# Patient Record
Sex: FEMALE | Race: BLACK OR AFRICAN AMERICAN | Employment: OTHER | URBAN - METROPOLITAN AREA
[De-identification: names, ages, dates, MRNs, and addresses within clinical notes are randomized per-mention and may not be internally consistent; named-entity substitution may affect disease eponyms.]

---

## 2017-02-08 PROBLEM — R10.13 EPIGASTRIC PAIN: Status: ACTIVE | Noted: 2017-02-08

## 2017-06-19 PROBLEM — E55.9 HYPOVITAMINOSIS D: Status: ACTIVE | Noted: 2017-06-19

## 2018-04-20 ENCOUNTER — HOSPITAL ENCOUNTER (OUTPATIENT)
Age: 58
Discharge: HOME | End: 2018-04-22
Payer: COMMERCIAL

## 2018-04-20 ENCOUNTER — HOSPITAL ENCOUNTER (OUTPATIENT)
Dept: GENERAL RADIOLOGY | Age: 58
Discharge: HOME | End: 2018-04-22
Payer: COMMERCIAL

## 2018-04-20 DIAGNOSIS — M25.512 PAIN IN JOINT OF LEFT SHOULDER: ICD-10-CM

## 2018-04-20 PROCEDURE — 73030 X-RAY EXAM OF SHOULDER: CPT

## 2018-04-22 ENCOUNTER — APPOINTMENT (OUTPATIENT)
Dept: ULTRASOUND IMAGING | Age: 58
End: 2018-04-22
Payer: COMMERCIAL

## 2018-04-22 ENCOUNTER — HOSPITAL ENCOUNTER (EMERGENCY)
Age: 58
Discharge: HOME | End: 2018-04-22
Payer: COMMERCIAL

## 2018-04-22 VITALS
OXYGEN SATURATION: 96 % | BODY MASS INDEX: 42.95 KG/M2 | HEIGHT: 69 IN | HEART RATE: 92 BPM | DIASTOLIC BLOOD PRESSURE: 85 MMHG | TEMPERATURE: 97.9 F | SYSTOLIC BLOOD PRESSURE: 158 MMHG | RESPIRATION RATE: 20 BRPM | WEIGHT: 290 LBS

## 2018-04-22 DIAGNOSIS — M79.602 LEFT ARM PAIN: Primary | ICD-10-CM

## 2018-04-22 PROCEDURE — 96372 THER/PROPH/DIAG INJ SC/IM: CPT

## 2018-04-22 PROCEDURE — 93971 EXTREMITY STUDY: CPT

## 2018-04-22 PROCEDURE — 99212 OFFICE O/P EST SF 10 MIN: CPT

## 2018-04-22 PROCEDURE — 6360000002 HC RX W HCPCS: Performed by: PHYSICIAN ASSISTANT

## 2018-04-22 RX ORDER — DEXAMETHASONE SODIUM PHOSPHATE 10 MG/ML
10 INJECTION, SOLUTION INTRAMUSCULAR; INTRAVENOUS ONCE
Status: COMPLETED | OUTPATIENT
Start: 2018-04-22 | End: 2018-04-22

## 2018-04-22 RX ADMIN — DEXAMETHASONE SODIUM PHOSPHATE 10 MG: 10 INJECTION, SOLUTION INTRAMUSCULAR; INTRAVENOUS at 14:40

## 2018-04-22 ASSESSMENT — ENCOUNTER SYMPTOMS
WHEEZING: 0
ABDOMINAL DISTENTION: 0
EYE DISCHARGE: 0
BACK PAIN: 0
VOMITING: 0
EYE REDNESS: 0
DIARRHEA: 0
SHORTNESS OF BREATH: 0
SORE THROAT: 0
COUGH: 0
EYE PAIN: 0
NAUSEA: 0
SINUS PRESSURE: 0

## 2018-04-22 ASSESSMENT — PAIN DESCRIPTION - ONSET: ONSET: GRADUAL

## 2018-04-22 ASSESSMENT — PAIN DESCRIPTION - PROGRESSION: CLINICAL_PROGRESSION: GRADUALLY WORSENING

## 2018-04-22 ASSESSMENT — PAIN DESCRIPTION - LOCATION: LOCATION: ARM

## 2018-04-22 ASSESSMENT — PAIN DESCRIPTION - FREQUENCY: FREQUENCY: CONTINUOUS

## 2018-04-22 ASSESSMENT — PAIN SCALES - GENERAL: PAINLEVEL_OUTOF10: 9

## 2018-04-22 ASSESSMENT — PAIN DESCRIPTION - DESCRIPTORS: DESCRIPTORS: ACHING;THROBBING

## 2018-04-22 ASSESSMENT — PAIN DESCRIPTION - ORIENTATION: ORIENTATION: LEFT

## 2018-04-22 ASSESSMENT — PAIN DESCRIPTION - PAIN TYPE: TYPE: ACUTE PAIN

## 2018-05-31 ENCOUNTER — HOSPITAL ENCOUNTER (EMERGENCY)
Age: 58
Discharge: HOME | End: 2018-05-31
Payer: COMMERCIAL

## 2018-05-31 VITALS
OXYGEN SATURATION: 97 % | HEART RATE: 88 BPM | BODY MASS INDEX: 42.09 KG/M2 | SYSTOLIC BLOOD PRESSURE: 100 MMHG | WEIGHT: 285 LBS | RESPIRATION RATE: 20 BRPM | TEMPERATURE: 98.3 F | DIASTOLIC BLOOD PRESSURE: 58 MMHG

## 2018-05-31 DIAGNOSIS — J45.31 MILD PERSISTENT ASTHMA WITH EXACERBATION: Primary | ICD-10-CM

## 2018-05-31 DIAGNOSIS — S81.802A WOUND OF LEFT LOWER EXTREMITY, INITIAL ENCOUNTER: ICD-10-CM

## 2018-05-31 PROCEDURE — 99213 OFFICE O/P EST LOW 20 MIN: CPT

## 2018-05-31 PROCEDURE — 94640 AIRWAY INHALATION TREATMENT: CPT

## 2018-05-31 PROCEDURE — 6370000000 HC RX 637 (ALT 250 FOR IP): Performed by: PHYSICIAN ASSISTANT

## 2018-05-31 RX ORDER — ALBUTEROL SULFATE 2.5 MG/3ML
2.5 SOLUTION RESPIRATORY (INHALATION) EVERY 6 HOURS PRN
Qty: 120 EACH | Refills: 0 | Status: SHIPPED | OUTPATIENT
Start: 2018-05-31 | End: 2019-06-26

## 2018-05-31 RX ORDER — IPRATROPIUM BROMIDE AND ALBUTEROL SULFATE 2.5; .5 MG/3ML; MG/3ML
1 SOLUTION RESPIRATORY (INHALATION) ONCE
Status: COMPLETED | OUTPATIENT
Start: 2018-05-31 | End: 2018-05-31

## 2018-05-31 RX ORDER — PREDNISONE 10 MG/1
40 TABLET ORAL DAILY
Qty: 20 TABLET | Refills: 0 | Status: SHIPPED | OUTPATIENT
Start: 2018-05-31 | End: 2018-06-05

## 2018-05-31 RX ADMIN — IPRATROPIUM BROMIDE AND ALBUTEROL SULFATE 1 AMPULE: .5; 3 SOLUTION RESPIRATORY (INHALATION) at 20:12

## 2018-06-21 ENCOUNTER — PREP FOR PROCEDURE (OUTPATIENT)
Dept: PAIN MANAGEMENT | Age: 58
End: 2018-06-21

## 2018-06-21 ENCOUNTER — OFFICE VISIT (OUTPATIENT)
Dept: PAIN MANAGEMENT | Age: 58
End: 2018-06-21
Payer: COMMERCIAL

## 2018-06-21 VITALS
BODY MASS INDEX: 42.95 KG/M2 | HEIGHT: 69 IN | WEIGHT: 290 LBS | HEART RATE: 86 BPM | RESPIRATION RATE: 16 BRPM | TEMPERATURE: 98.3 F | OXYGEN SATURATION: 95 % | DIASTOLIC BLOOD PRESSURE: 76 MMHG | SYSTOLIC BLOOD PRESSURE: 122 MMHG

## 2018-06-21 DIAGNOSIS — M25.512 CHRONIC LEFT SHOULDER PAIN: ICD-10-CM

## 2018-06-21 DIAGNOSIS — G89.4 CHRONIC PAIN SYNDROME: ICD-10-CM

## 2018-06-21 DIAGNOSIS — M47.816 LUMBAR FACET ARTHROPATHY: ICD-10-CM

## 2018-06-21 DIAGNOSIS — M47.816 LUMBAR SPONDYLOSIS: Primary | ICD-10-CM

## 2018-06-21 DIAGNOSIS — M25.571 CHRONIC PAIN OF RIGHT ANKLE: ICD-10-CM

## 2018-06-21 DIAGNOSIS — M17.0 PRIMARY OSTEOARTHRITIS OF BOTH KNEES: ICD-10-CM

## 2018-06-21 DIAGNOSIS — M19.012 PRIMARY OSTEOARTHRITIS OF LEFT SHOULDER: ICD-10-CM

## 2018-06-21 DIAGNOSIS — G89.29 CHRONIC LEFT SHOULDER PAIN: ICD-10-CM

## 2018-06-21 DIAGNOSIS — G89.29 CHRONIC PAIN OF RIGHT ANKLE: ICD-10-CM

## 2018-06-21 PROCEDURE — 99204 OFFICE O/P NEW MOD 45 MIN: CPT | Performed by: PAIN MEDICINE

## 2018-06-21 RX ORDER — MELOXICAM 7.5 MG/1
7.5 TABLET ORAL DAILY
Qty: 30 TABLET | Refills: 0 | Status: SHIPPED | OUTPATIENT
Start: 2018-06-21 | End: 2019-04-17 | Stop reason: ALTCHOICE

## 2018-06-21 RX ORDER — SODIUM CHLORIDE 0.9 % (FLUSH) 0.9 %
10 SYRINGE (ML) INJECTION EVERY 12 HOURS SCHEDULED
Status: CANCELLED | OUTPATIENT
Start: 2018-06-21

## 2018-06-21 RX ORDER — SODIUM CHLORIDE 0.9 % (FLUSH) 0.9 %
10 SYRINGE (ML) INJECTION PRN
Status: CANCELLED | OUTPATIENT
Start: 2018-06-21

## 2018-08-08 ENCOUNTER — HOSPITAL ENCOUNTER (EMERGENCY)
Age: 58
Discharge: HOME | End: 2018-08-08
Payer: COMMERCIAL

## 2018-08-08 VITALS
HEART RATE: 96 BPM | TEMPERATURE: 98.1 F | SYSTOLIC BLOOD PRESSURE: 139 MMHG | RESPIRATION RATE: 18 BRPM | DIASTOLIC BLOOD PRESSURE: 75 MMHG | OXYGEN SATURATION: 97 % | WEIGHT: 290 LBS | BODY MASS INDEX: 42.83 KG/M2

## 2018-08-08 DIAGNOSIS — J45.901 EXACERBATION OF ASTHMA, UNSPECIFIED ASTHMA SEVERITY, UNSPECIFIED WHETHER PERSISTENT: Primary | ICD-10-CM

## 2018-08-08 DIAGNOSIS — J40 BRONCHITIS: ICD-10-CM

## 2018-08-08 PROCEDURE — 6360000002 HC RX W HCPCS: Performed by: NURSE PRACTITIONER

## 2018-08-08 PROCEDURE — 96372 THER/PROPH/DIAG INJ SC/IM: CPT

## 2018-08-08 PROCEDURE — 99212 OFFICE O/P EST SF 10 MIN: CPT

## 2018-08-08 RX ORDER — PREDNISONE 10 MG/1
40 TABLET ORAL DAILY
Qty: 20 TABLET | Refills: 0 | Status: SHIPPED | OUTPATIENT
Start: 2018-08-08 | End: 2018-08-13

## 2018-08-08 RX ORDER — DEXAMETHASONE SODIUM PHOSPHATE 10 MG/ML
10 INJECTION, SOLUTION INTRAMUSCULAR; INTRAVENOUS ONCE
Status: COMPLETED | OUTPATIENT
Start: 2018-08-08 | End: 2018-08-08

## 2018-08-08 RX ADMIN — DEXAMETHASONE SODIUM PHOSPHATE 10 MG: 10 INJECTION INTRAMUSCULAR; INTRAVENOUS at 20:01

## 2018-08-08 ASSESSMENT — PAIN SCALES - GENERAL: PAINLEVEL_OUTOF10: 8

## 2018-08-08 ASSESSMENT — PAIN DESCRIPTION - DESCRIPTORS: DESCRIPTORS: ACHING

## 2018-08-08 ASSESSMENT — PAIN DESCRIPTION - LOCATION: LOCATION: GENERALIZED

## 2018-08-13 NOTE — ED PROVIDER NOTES
sister; High Cholesterol in her father and mother; Hypertension in her mother; Osteoarthritis in her father, mother, and sister. The patients home medications have been reviewed. Allergies: Augmentin [amoxicillin-pot clavulanate] and Coricidin d cold-flu-sinus [chlorphen-pe-acetaminophen]       ---------------------------------------------------PHYSICAL EXAM--------------------------------------    Constitutional/General: Alert and oriented x3, well appearing, non toxic in NAD  Head: Normocephalic and atraumatic  Eyes: PERRL, EOMI  Mouth: Oropharynx clear, handling secretions, no trismus  Neck: Supple, full ROM, non tender to palpation in the midline, no stridor, no crepitus, no meningeal signs  Pulmonary: Lungs  Diminished with slight exp wheeze. Not in respiratory distress  Cardiovascular:  Regular rate. Regular rhythm. No murmurs, gallops, or rubs. 2+ distal pulses  Chest: no chest wall tenderness  Abdomen: Soft. Non tender. Non distended. Musculoskeletal: Moves all extremities x 4. Warm and well perfused, no clubbing, cyanosis, or edema. Capillary refill <3 seconds  Skin: warm and dry. No rashes. Neurologic: GCS 15  Psych: Normal Affect      -------------------------------------------------- RESULTS -------------------------------------------------    LABS:  No results found for this visit on 08/08/18. RADIOLOGY:  Interpreted by Radiologist.  No orders to display           ------------------------- NURSING NOTES AND VITALS REVIEWED ---------------------------   The nursing notes within the ED encounter and vital signs as below have been reviewed. /75   Pulse 96   Temp 98.1 °F (36.7 °C) (Oral)   Resp 18   Wt 290 lb (131.5 kg)   SpO2 97%   BMI 42.83 kg/m²   Oxygen Saturation Interpretation: Normal    The patients available past medical records and past encounters were reviewed.         ------------------------------ ED COURSE/MEDICAL DECISION MAKING----------------------  Medications dexamethasone (PF) (DECADRON) injection 10 mg (10 mg Intramuscular Given 8/8/18 2001)             Medical Decision Making:    Exam and history c/w bronchitis.  No evidence of localizing infection or PNA.  She was ordered prednisone and a z- pack    Plan:        This patient's ED course included: a personal history and physicial eaxmination and re-evaluation prior to disposition    This patient has remained hemodynamically stable during their ED course. Counseling: The emergency provider has spoken with the patient and discussed todays results, in addition to providing specific details for the plan of care and counseling regarding the diagnosis and prognosis. Questions are answered at this time and they are agreeable with the plan.       --------------------------------- IMPRESSION AND DISPOSITION ---------------------------------    IMPRESSION  1. Exacerbation of asthma, unspecified asthma severity, unspecified whether persistent    2.  Bronchitis        DISPOSITION  Disposition: Discharge to home  Patient condition is good           HERBERT Jameson - CNP  08/13/18 9676

## 2018-08-16 ENCOUNTER — HOSPITAL ENCOUNTER (OUTPATIENT)
Dept: CT IMAGING | Age: 58
Discharge: HOME | End: 2018-08-16
Payer: COMMERCIAL

## 2018-08-16 DIAGNOSIS — R91.1 LUNG NODULE: ICD-10-CM

## 2018-08-16 PROCEDURE — 71250 CT THORAX DX C-: CPT

## 2018-09-29 ENCOUNTER — HOSPITAL ENCOUNTER (EMERGENCY)
Age: 58
Discharge: HOME | End: 2018-09-29
Payer: COMMERCIAL

## 2018-09-29 VITALS
DIASTOLIC BLOOD PRESSURE: 79 MMHG | BODY MASS INDEX: 42.83 KG/M2 | TEMPERATURE: 98.3 F | WEIGHT: 290 LBS | SYSTOLIC BLOOD PRESSURE: 112 MMHG | OXYGEN SATURATION: 95 % | RESPIRATION RATE: 20 BRPM | HEART RATE: 99 BPM

## 2018-09-29 DIAGNOSIS — J45.21 MILD INTERMITTENT ASTHMA WITH EXACERBATION: Primary | ICD-10-CM

## 2018-09-29 PROCEDURE — 6360000002 HC RX W HCPCS: Performed by: PHYSICIAN ASSISTANT

## 2018-09-29 PROCEDURE — 6370000000 HC RX 637 (ALT 250 FOR IP): Performed by: PHYSICIAN ASSISTANT

## 2018-09-29 PROCEDURE — 99212 OFFICE O/P EST SF 10 MIN: CPT

## 2018-09-29 RX ORDER — AZITHROMYCIN 250 MG/1
TABLET, FILM COATED ORAL
Qty: 6 TABLET | Refills: 0 | Status: SHIPPED | OUTPATIENT
Start: 2018-09-29 | End: 2018-10-09

## 2018-09-29 RX ORDER — DEXAMETHASONE SODIUM PHOSPHATE 10 MG/ML
10 INJECTION, SOLUTION INTRAMUSCULAR; INTRAVENOUS ONCE
Status: COMPLETED | OUTPATIENT
Start: 2018-09-29 | End: 2018-09-29

## 2018-09-29 RX ORDER — IPRATROPIUM BROMIDE AND ALBUTEROL SULFATE 2.5; .5 MG/3ML; MG/3ML
1 SOLUTION RESPIRATORY (INHALATION) ONCE
Status: COMPLETED | OUTPATIENT
Start: 2018-09-29 | End: 2018-09-29

## 2018-09-29 RX ORDER — PREDNISONE 10 MG/1
40 TABLET ORAL DAILY
Qty: 20 TABLET | Refills: 0 | Status: SHIPPED | OUTPATIENT
Start: 2018-09-29 | End: 2018-10-04

## 2018-09-29 RX ADMIN — IPRATROPIUM BROMIDE AND ALBUTEROL SULFATE 1 AMPULE: .5; 3 SOLUTION RESPIRATORY (INHALATION) at 16:46

## 2018-09-29 RX ADMIN — DEXAMETHASONE SODIUM PHOSPHATE 10 MG: 10 INJECTION INTRAMUSCULAR; INTRAVENOUS at 16:46

## 2018-09-29 ASSESSMENT — PAIN DESCRIPTION - LOCATION: LOCATION: GENERALIZED

## 2018-09-29 ASSESSMENT — PAIN SCALES - GENERAL: PAINLEVEL_OUTOF10: 7

## 2018-09-29 ASSESSMENT — PAIN DESCRIPTION - PAIN TYPE: TYPE: ACUTE PAIN

## 2018-10-11 ENCOUNTER — HOSPITAL ENCOUNTER (EMERGENCY)
Age: 58
Discharge: HOME | End: 2018-10-11
Payer: COMMERCIAL

## 2018-10-11 VITALS
TEMPERATURE: 98.7 F | HEART RATE: 105 BPM | SYSTOLIC BLOOD PRESSURE: 143 MMHG | RESPIRATION RATE: 20 BRPM | DIASTOLIC BLOOD PRESSURE: 81 MMHG | WEIGHT: 280 LBS | OXYGEN SATURATION: 96 % | BODY MASS INDEX: 41.35 KG/M2

## 2018-10-11 DIAGNOSIS — J45.901 MODERATE ASTHMA WITH ACUTE EXACERBATION, UNSPECIFIED WHETHER PERSISTENT: Primary | ICD-10-CM

## 2018-10-11 DIAGNOSIS — R05.9 COUGH: ICD-10-CM

## 2018-10-11 PROCEDURE — 94640 AIRWAY INHALATION TREATMENT: CPT

## 2018-10-11 PROCEDURE — 6370000000 HC RX 637 (ALT 250 FOR IP): Performed by: PHYSICIAN ASSISTANT

## 2018-10-11 PROCEDURE — 99213 OFFICE O/P EST LOW 20 MIN: CPT

## 2018-10-11 RX ORDER — IPRATROPIUM BROMIDE AND ALBUTEROL SULFATE 2.5; .5 MG/3ML; MG/3ML
1 SOLUTION RESPIRATORY (INHALATION) ONCE
Status: COMPLETED | OUTPATIENT
Start: 2018-10-11 | End: 2018-10-11

## 2018-10-11 RX ORDER — BENZONATATE 100 MG/1
100 CAPSULE ORAL 3 TIMES DAILY PRN
Qty: 21 CAPSULE | Refills: 0 | Status: SHIPPED | OUTPATIENT
Start: 2018-10-11 | End: 2018-10-18

## 2018-10-11 RX ADMIN — IPRATROPIUM BROMIDE AND ALBUTEROL SULFATE 1 AMPULE: .5; 3 SOLUTION RESPIRATORY (INHALATION) at 11:27

## 2018-10-11 ASSESSMENT — PAIN DESCRIPTION - LOCATION: LOCATION: GENERALIZED

## 2018-10-11 ASSESSMENT — PAIN DESCRIPTION - PAIN TYPE: TYPE: ACUTE PAIN

## 2018-10-11 ASSESSMENT — PAIN SCALES - GENERAL: PAINLEVEL_OUTOF10: 8

## 2018-10-11 NOTE — ED PROVIDER NOTES
Procedure Laterality Date    COLONOSCOPY      HYSTERECTOMY      SINUS SURGERY      TONSILLECTOMY      TONSILLECTOMY     Social History:  reports that she has never smoked. She has never used smokeless tobacco. She reports that she does not drink alcohol or use drugs. Family History: family history includes Cancer in her sister; High Cholesterol in her father and mother; Hypertension in her mother; Osteoarthritis in her father, mother, and sister. Allergies: Augmentin [amoxicillin-pot clavulanate] and Coricidin d cold-flu-sinus [chlorphen-pe-acetaminophen]    Physical Exam            ED Triage Vitals [10/11/18 1054]   BP Temp Temp Source Pulse Resp SpO2 Height Weight   (!) 143/81 98.7 °F (37.1 °C) Oral 105 20 96 % -- 280 lb (127 kg)      Oxygen Saturation Interpretation: Normal.    Constitutional:  Alert, development consistent with age. Ears:  External Ears: Bilateral normal.               TM's & External Canals: normal appearance, normal TMs bilaterally. Nose:   There is no discharge, swelling or lesions noted. Sinuses: no Bilateral maxillary sinus tenderness. no Bilateral frontal sinus tenderness. Mouth:  normal tongue and buccal mucosa. Throat: no erythema or exudates noted. Teeth and gums normal..  Airway Patent. Neck/Lymphatics:  Neck Supple. There is no  preauricular, anterior cervical and posterior cervical node tenderness. Respiratory:   Breath sounds: Bilateral normal.  Lung sounds: wheezing- scattered. CV:  Regular rate and rhythm, normal heart sounds, without pathological murmurs, ectopy, gallops, or rubs. GI:  Abdomen Soft, nontender, good bowel sounds. No firm or pulsatile mass. Integument:  Normal turgor. Warm, dry, without visible rash. Neurological:  Oriented. Motor functions intact.        Lab / Imaging Results   (All laboratory and radiology results have been personally reviewed by myself)  Labs:  No results found for this visit on 100 MG CAPSULE    Take 1 capsule by mouth 3 times daily as needed for Cough     Electronically signed by TAVARES Cohen   DD: 10/11/18  **This report was transcribed using voice recognition software. Every effort was made to ensure accuracy; however, inadvertent computerized transcription errors may be present.   END OF ED PROVIDER NOTE        TAVARES Ugalde  10/11/18 [address] [place] [address]  10/11/18 6036

## 2018-10-31 ENCOUNTER — HOSPITAL ENCOUNTER (EMERGENCY)
Age: 58
Discharge: HOME | End: 2018-10-31
Payer: COMMERCIAL

## 2018-10-31 VITALS
TEMPERATURE: 99.1 F | WEIGHT: 280 LBS | DIASTOLIC BLOOD PRESSURE: 63 MMHG | BODY MASS INDEX: 41.35 KG/M2 | SYSTOLIC BLOOD PRESSURE: 105 MMHG | HEART RATE: 100 BPM | RESPIRATION RATE: 20 BRPM | OXYGEN SATURATION: 95 %

## 2018-10-31 DIAGNOSIS — J06.9 ACUTE UPPER RESPIRATORY INFECTION: Primary | ICD-10-CM

## 2018-10-31 DIAGNOSIS — J45.20 INTERMITTENT ASTHMA, UNSPECIFIED ASTHMA SEVERITY, UNSPECIFIED WHETHER COMPLICATED: ICD-10-CM

## 2018-10-31 PROCEDURE — 94640 AIRWAY INHALATION TREATMENT: CPT

## 2018-10-31 PROCEDURE — 6370000000 HC RX 637 (ALT 250 FOR IP): Performed by: NURSE PRACTITIONER

## 2018-10-31 PROCEDURE — 6360000002 HC RX W HCPCS: Performed by: NURSE PRACTITIONER

## 2018-10-31 PROCEDURE — 99212 OFFICE O/P EST SF 10 MIN: CPT

## 2018-10-31 RX ORDER — AZITHROMYCIN 250 MG/1
TABLET, FILM COATED ORAL
Qty: 6 TABLET | Refills: 0 | Status: SHIPPED | OUTPATIENT
Start: 2018-10-31 | End: 2018-11-10

## 2018-10-31 RX ORDER — IPRATROPIUM BROMIDE AND ALBUTEROL SULFATE 2.5; .5 MG/3ML; MG/3ML
1 SOLUTION RESPIRATORY (INHALATION) ONCE
Status: COMPLETED | OUTPATIENT
Start: 2018-10-31 | End: 2018-10-31

## 2018-10-31 RX ORDER — METHYLPREDNISOLONE 4 MG/1
TABLET ORAL
Qty: 21 TABLET | Status: SHIPPED | OUTPATIENT
Start: 2018-10-31 | End: 2018-11-06

## 2018-10-31 RX ORDER — DEXAMETHASONE SODIUM PHOSPHATE 10 MG/ML
10 INJECTION, SOLUTION INTRAMUSCULAR; INTRAVENOUS ONCE
Status: COMPLETED | OUTPATIENT
Start: 2018-10-31 | End: 2018-10-31

## 2018-10-31 RX ADMIN — IPRATROPIUM BROMIDE AND ALBUTEROL SULFATE 1 AMPULE: .5; 3 SOLUTION RESPIRATORY (INHALATION) at 12:56

## 2018-10-31 RX ADMIN — DEXAMETHASONE SODIUM PHOSPHATE 10 MG: 10 INJECTION, SOLUTION INTRAMUSCULAR; INTRAVENOUS at 12:56

## 2019-01-06 ENCOUNTER — HOSPITAL ENCOUNTER (EMERGENCY)
Age: 59
Discharge: HOME | End: 2019-01-06
Payer: COMMERCIAL

## 2019-01-06 VITALS
RESPIRATION RATE: 20 BRPM | BODY MASS INDEX: 40.32 KG/M2 | SYSTOLIC BLOOD PRESSURE: 136 MMHG | TEMPERATURE: 98.4 F | OXYGEN SATURATION: 97 % | HEART RATE: 94 BPM | DIASTOLIC BLOOD PRESSURE: 93 MMHG | WEIGHT: 273 LBS

## 2019-01-06 DIAGNOSIS — J45.901 EXACERBATION OF ASTHMA, UNSPECIFIED ASTHMA SEVERITY, UNSPECIFIED WHETHER PERSISTENT: Primary | ICD-10-CM

## 2019-01-06 PROCEDURE — 94640 AIRWAY INHALATION TREATMENT: CPT

## 2019-01-06 PROCEDURE — 96372 THER/PROPH/DIAG INJ SC/IM: CPT

## 2019-01-06 PROCEDURE — 6370000000 HC RX 637 (ALT 250 FOR IP): Performed by: NURSE PRACTITIONER

## 2019-01-06 PROCEDURE — 6360000002 HC RX W HCPCS: Performed by: NURSE PRACTITIONER

## 2019-01-06 PROCEDURE — 99212 OFFICE O/P EST SF 10 MIN: CPT

## 2019-01-06 RX ORDER — IPRATROPIUM BROMIDE AND ALBUTEROL SULFATE 2.5; .5 MG/3ML; MG/3ML
1 SOLUTION RESPIRATORY (INHALATION) ONCE
Status: COMPLETED | OUTPATIENT
Start: 2019-01-06 | End: 2019-01-06

## 2019-01-06 RX ORDER — AZITHROMYCIN 250 MG/1
TABLET, FILM COATED ORAL
Qty: 6 TABLET | Refills: 0 | Status: SHIPPED | OUTPATIENT
Start: 2019-01-06 | End: 2019-01-16

## 2019-01-06 RX ORDER — PREDNISONE 10 MG/1
40 TABLET ORAL DAILY
Qty: 20 TABLET | Refills: 0 | Status: SHIPPED | OUTPATIENT
Start: 2019-01-06 | End: 2019-01-11

## 2019-01-06 RX ORDER — DEXAMETHASONE SODIUM PHOSPHATE 10 MG/ML
10 INJECTION, SOLUTION INTRAMUSCULAR; INTRAVENOUS ONCE
Status: COMPLETED | OUTPATIENT
Start: 2019-01-06 | End: 2019-01-06

## 2019-01-06 RX ADMIN — DEXAMETHASONE SODIUM PHOSPHATE 10 MG: 10 INJECTION, SOLUTION INTRAMUSCULAR; INTRAVENOUS at 17:54

## 2019-01-06 RX ADMIN — IPRATROPIUM BROMIDE AND ALBUTEROL SULFATE 1 AMPULE: .5; 3 SOLUTION RESPIRATORY (INHALATION) at 17:54

## 2019-02-22 ENCOUNTER — HOSPITAL ENCOUNTER (OUTPATIENT)
Age: 59
Discharge: HOME | End: 2019-02-24
Payer: COMMERCIAL

## 2019-02-22 ENCOUNTER — HOSPITAL ENCOUNTER (OUTPATIENT)
Dept: GENERAL RADIOLOGY | Age: 59
Discharge: HOME | End: 2019-02-24
Payer: COMMERCIAL

## 2019-02-22 DIAGNOSIS — J44.9 OBSTRUCTIVE CHRONIC BRONCHITIS WITHOUT EXACERBATION (HCC): ICD-10-CM

## 2019-02-22 PROCEDURE — 71046 X-RAY EXAM CHEST 2 VIEWS: CPT

## 2019-04-13 ENCOUNTER — HOSPITAL ENCOUNTER (EMERGENCY)
Age: 59
Discharge: HOME | End: 2019-04-13
Payer: COMMERCIAL

## 2019-04-13 VITALS
BODY MASS INDEX: 40.17 KG/M2 | OXYGEN SATURATION: 98 % | WEIGHT: 272 LBS | HEART RATE: 89 BPM | SYSTOLIC BLOOD PRESSURE: 134 MMHG | RESPIRATION RATE: 20 BRPM | DIASTOLIC BLOOD PRESSURE: 77 MMHG | TEMPERATURE: 98.6 F

## 2019-04-13 DIAGNOSIS — J40 BRONCHITIS: Primary | ICD-10-CM

## 2019-04-13 DIAGNOSIS — J98.01 BRONCHOSPASM, ACUTE: ICD-10-CM

## 2019-04-13 PROCEDURE — 99212 OFFICE O/P EST SF 10 MIN: CPT

## 2019-04-13 PROCEDURE — 6360000002 HC RX W HCPCS: Performed by: NURSE PRACTITIONER

## 2019-04-13 PROCEDURE — 6370000000 HC RX 637 (ALT 250 FOR IP): Performed by: NURSE PRACTITIONER

## 2019-04-13 RX ORDER — PREDNISONE 10 MG/1
40 TABLET ORAL DAILY
Qty: 20 TABLET | Refills: 0 | Status: SHIPPED | OUTPATIENT
Start: 2019-04-13 | End: 2019-04-18

## 2019-04-13 RX ORDER — GUAIFENESIN/DEXTROMETHORPHAN 100-10MG/5
10 SYRUP ORAL 3 TIMES DAILY PRN
Qty: 120 ML | Refills: 0 | Status: SHIPPED | OUTPATIENT
Start: 2019-04-13 | End: 2019-04-23

## 2019-04-13 RX ORDER — IPRATROPIUM BROMIDE AND ALBUTEROL SULFATE 2.5; .5 MG/3ML; MG/3ML
1 SOLUTION RESPIRATORY (INHALATION) ONCE
Status: COMPLETED | OUTPATIENT
Start: 2019-04-13 | End: 2019-04-13

## 2019-04-13 RX ORDER — AZITHROMYCIN 250 MG/1
TABLET, FILM COATED ORAL
Qty: 1 PACKET | Refills: 0 | Status: SHIPPED | OUTPATIENT
Start: 2019-04-13 | End: 2019-04-17

## 2019-04-13 RX ORDER — DEXAMETHASONE SODIUM PHOSPHATE 10 MG/ML
10 INJECTION, SOLUTION INTRAMUSCULAR; INTRAVENOUS ONCE
Status: COMPLETED | OUTPATIENT
Start: 2019-04-13 | End: 2019-04-13

## 2019-04-13 RX ADMIN — IPRATROPIUM BROMIDE AND ALBUTEROL SULFATE 1 AMPULE: .5; 3 SOLUTION RESPIRATORY (INHALATION) at 16:55

## 2019-04-13 RX ADMIN — DEXAMETHASONE SODIUM PHOSPHATE 10 MG: 10 INJECTION, SOLUTION INTRAMUSCULAR; INTRAVENOUS at 16:55

## 2019-04-17 ENCOUNTER — HOSPITAL ENCOUNTER (EMERGENCY)
Age: 59
Discharge: HOME | End: 2019-04-17
Payer: COMMERCIAL

## 2019-04-17 ENCOUNTER — APPOINTMENT (OUTPATIENT)
Dept: GENERAL RADIOLOGY | Age: 59
End: 2019-04-17
Payer: COMMERCIAL

## 2019-04-17 VITALS
SYSTOLIC BLOOD PRESSURE: 131 MMHG | HEIGHT: 68 IN | OXYGEN SATURATION: 96 % | BODY MASS INDEX: 40.92 KG/M2 | TEMPERATURE: 98.3 F | HEART RATE: 97 BPM | WEIGHT: 270 LBS | DIASTOLIC BLOOD PRESSURE: 77 MMHG | RESPIRATION RATE: 20 BRPM

## 2019-04-17 DIAGNOSIS — K04.7 DENTAL ABSCESS: ICD-10-CM

## 2019-04-17 DIAGNOSIS — S80.01XA CONTUSION OF RIGHT KNEE, INITIAL ENCOUNTER: Primary | ICD-10-CM

## 2019-04-17 DIAGNOSIS — M17.11 OSTEOARTHRITIS OF RIGHT KNEE, UNSPECIFIED OSTEOARTHRITIS TYPE: ICD-10-CM

## 2019-04-17 DIAGNOSIS — S43.409A SPRAIN OF SHOULDER, UNSPECIFIED LATERALITY, UNSPECIFIED SHOULDER SPRAIN TYPE, INITIAL ENCOUNTER: ICD-10-CM

## 2019-04-17 DIAGNOSIS — M77.9 TENDONITIS: ICD-10-CM

## 2019-04-17 LAB
BASOPHILS ABSOLUTE: 0.05 E9/L (ref 0–0.2)
BASOPHILS RELATIVE PERCENT: 0.7 % (ref 0–2)
CO2: 32 MMOL/L (ref 22–29)
EOSINOPHILS ABSOLUTE: 0.35 E9/L (ref 0.05–0.5)
EOSINOPHILS RELATIVE PERCENT: 5.1 % (ref 0–6)
GFR AFRICAN AMERICAN: >60
GFR NON-AFRICAN AMERICAN: >60 ML/MIN/1.73
GLUCOSE BLD-MCNC: 112 MG/DL (ref 74–99)
HCT VFR BLD CALC: 40.6 % (ref 34–48)
HEMOGLOBIN: 12.6 G/DL (ref 11.5–15.5)
IMMATURE GRANULOCYTES #: 0.05 E9/L
IMMATURE GRANULOCYTES %: 0.7 % (ref 0–5)
LYMPHOCYTES ABSOLUTE: 2.23 E9/L (ref 1.5–4)
LYMPHOCYTES RELATIVE PERCENT: 32.5 % (ref 20–42)
MCH RBC QN AUTO: 26.2 PG (ref 26–35)
MCHC RBC AUTO-ENTMCNC: 31 % (ref 32–34.5)
MCV RBC AUTO: 84.4 FL (ref 80–99.9)
MONOCYTES ABSOLUTE: 0.78 E9/L (ref 0.1–0.95)
MONOCYTES RELATIVE PERCENT: 11.4 % (ref 2–12)
NEUTROPHILS ABSOLUTE: 3.41 E9/L (ref 1.8–7.3)
NEUTROPHILS RELATIVE PERCENT: 49.6 % (ref 43–80)
PDW BLD-RTO: 13.9 FL (ref 11.5–15)
PLATELET # BLD: 346 E9/L (ref 130–450)
PMV BLD AUTO: 9.5 FL (ref 7–12)
POC ANION GAP: 7 MMOL/L (ref 7–16)
POC BUN: 16 MG/DL (ref 8–23)
POC CHLORIDE: 103 MMOL/L (ref 100–108)
POC CREATININE: 0.8 MG/DL (ref 0.5–1)
POC POTASSIUM: 3.8 MMOL/L (ref 3.5–5)
POC SODIUM: 142 MMOL/L (ref 132–146)
RBC # BLD: 4.81 E12/L (ref 3.5–5.5)
STREP GRP A PCR: NEGATIVE
WBC # BLD: 6.9 E9/L (ref 4.5–11.5)

## 2019-04-17 PROCEDURE — 36415 COLL VENOUS BLD VENIPUNCTURE: CPT

## 2019-04-17 PROCEDURE — 99212 OFFICE O/P EST SF 10 MIN: CPT

## 2019-04-17 PROCEDURE — 73030 X-RAY EXAM OF SHOULDER: CPT

## 2019-04-17 PROCEDURE — 85025 COMPLETE CBC W/AUTO DIFF WBC: CPT

## 2019-04-17 PROCEDURE — 84520 ASSAY OF UREA NITROGEN: CPT

## 2019-04-17 PROCEDURE — 87880 STREP A ASSAY W/OPTIC: CPT

## 2019-04-17 PROCEDURE — 82947 ASSAY GLUCOSE BLOOD QUANT: CPT

## 2019-04-17 PROCEDURE — 80051 ELECTROLYTE PANEL: CPT

## 2019-04-17 PROCEDURE — 73560 X-RAY EXAM OF KNEE 1 OR 2: CPT

## 2019-04-17 PROCEDURE — 82565 ASSAY OF CREATININE: CPT

## 2019-04-17 RX ORDER — NAPROXEN 500 MG/1
500 TABLET ORAL 2 TIMES DAILY
Qty: 14 TABLET | Refills: 0 | Status: SHIPPED | OUTPATIENT
Start: 2019-04-17 | End: 2020-02-17 | Stop reason: ALTCHOICE

## 2019-04-17 RX ORDER — CLINDAMYCIN HYDROCHLORIDE 300 MG/1
300 CAPSULE ORAL 3 TIMES DAILY
Qty: 30 CAPSULE | Refills: 0 | Status: SHIPPED | OUTPATIENT
Start: 2019-04-17 | End: 2019-04-27

## 2019-04-17 ASSESSMENT — PAIN DESCRIPTION - PROGRESSION: CLINICAL_PROGRESSION: GRADUALLY WORSENING

## 2019-04-17 ASSESSMENT — PAIN SCALES - GENERAL: PAINLEVEL_OUTOF10: 8

## 2019-04-17 ASSESSMENT — PAIN DESCRIPTION - ORIENTATION: ORIENTATION: RIGHT;LEFT

## 2019-04-17 ASSESSMENT — PAIN DESCRIPTION - LOCATION: LOCATION: NECK;KNEE;SHOULDER

## 2019-04-17 ASSESSMENT — PAIN DESCRIPTION - ONSET: ONSET: GRADUAL

## 2019-04-17 ASSESSMENT — PAIN DESCRIPTION - DESCRIPTORS: DESCRIPTORS: ACHING;SORE

## 2019-04-17 ASSESSMENT — PAIN DESCRIPTION - PAIN TYPE: TYPE: ACUTE PAIN

## 2019-04-17 ASSESSMENT — PAIN DESCRIPTION - FREQUENCY: FREQUENCY: CONTINUOUS
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
## 2019-06-26 ENCOUNTER — HOSPITAL ENCOUNTER (EMERGENCY)
Age: 59
Discharge: HOME | End: 2019-06-26
Payer: COMMERCIAL

## 2019-06-26 VITALS
DIASTOLIC BLOOD PRESSURE: 89 MMHG | RESPIRATION RATE: 20 BRPM | SYSTOLIC BLOOD PRESSURE: 130 MMHG | TEMPERATURE: 98 F | OXYGEN SATURATION: 97 % | HEART RATE: 97 BPM

## 2019-06-26 DIAGNOSIS — J06.9 UPPER RESPIRATORY TRACT INFECTION, UNSPECIFIED TYPE: Primary | ICD-10-CM

## 2019-06-26 PROCEDURE — 99212 OFFICE O/P EST SF 10 MIN: CPT

## 2019-06-26 RX ORDER — PREDNISONE 10 MG/1
40 TABLET ORAL DAILY
Qty: 20 TABLET | Refills: 0 | Status: SHIPPED | OUTPATIENT
Start: 2019-06-26 | End: 2019-07-01

## 2019-06-26 RX ORDER — ALBUTEROL SULFATE 2.5 MG/3ML
2.5 SOLUTION RESPIRATORY (INHALATION) EVERY 6 HOURS PRN
Qty: 120 EACH | Refills: 0 | Status: SHIPPED | OUTPATIENT
Start: 2019-06-26 | End: 2019-09-10 | Stop reason: SDUPTHER

## 2019-06-26 RX ORDER — AZITHROMYCIN 250 MG/1
TABLET, FILM COATED ORAL
Qty: 6 TABLET | Refills: 0 | Status: SHIPPED | OUTPATIENT
Start: 2019-06-26 | End: 2019-07-06

## 2019-06-26 NOTE — ED PROVIDER NOTES
Department of Emergency Medicine   ED  Provider Note  Admit Date/RoomTime: 6/26/2019  7:28 PM  ED Room: 07/07  Chief Complaint:   URI (c/o congestion and drainage w loose productive cough - bilateral ear pressure )    History of Present Illness   Source of history provided by:  patient. History/Exam Limitations: none. Marlyn Salomon is a 60 y.o. old female with a past medical history of:   Past Medical History:   Diagnosis Date    Arthritis     Asthma     COPD (chronic obstructive pulmonary disease) ([facility]     Depressed     Diabetes mellitus ([facility]     Fibromyalgia     Hepatitis     HIV (human immunodeficiency virus infection) ([facility]     Hyperlipidemia     Hypertension     MS (multiple sclerosis) ([facility]     Respiratory disease     Sleep apnea     Sleep apnea     presents to the emergency department by private vehicle, for cough, sore throat, sinus pressure that started 2 days ago. She also c/o b/l ear pressure. She reports yellow sputum production. No chest pain or shortness of breath. No vomiting or diarrhea. Nothing makes it better or worse. No fevers or chills. ROS   Pertinent positives and negatives are stated within HPI, all other systems reviewed and are negative. Past Surgical History:  has a past surgical history that includes sinus surgery; Hysterectomy; Tonsillectomy; Colonoscopy; and Tonsillectomy. Social History:  reports that she has never smoked. She has never used smokeless tobacco. She reports that she does not drink alcohol or use drugs. Family History: family history includes Cancer in her sister; High Cholesterol in her father and mother; Hypertension in her mother; Osteoarthritis in her father, mother, and sister. Allergies: Amoxicillin;  Augmentin [amoxicillin-pot clavulanate]; and Coricidin d cold-flu-sinus [chlorphen-pe-acetaminophen]    Physical Exam           ED Triage Vitals   BP Temp Temp src Pulse Resp SpO2 Height Weight   -- -- -- -- -- -- -- -- Oxygen Saturation Interpretation: Normal.    · Constitutional:  Alert, development consistent with age. · Ears:  External Ears: Bilateral normal, mastoid tenderness absen tb/l. TM's & External Canals: normal TMs bilaterally. No erythema or bulging b/l  · Nose:   There is no discharge, swelling or lesions noted. · Sinuses: no Bilateral maxillary sinus tenderness. no Bilateral frontal sinus tenderness. · Mouth:  normal tongue and buccal mucosa. · Throat: no erythema or exudates noted. Teeth and gums normal. and mucous membranes moist.  Airway Patent. · Neck:  Supple. There is no  anterior cervical and posterior cervical node tenderness. · Respiratory:   Breath sounds: Bilateral normal.  Lung sounds: wheezing- scattered. No respiratory distress. · CV:  Regular rate and rhythm, normal heart sounds, without pathological murmurs, ectopy, gallops, or rubs. · GI:  Abdomen Soft, nontender, good bowel sounds. No firm or pulsatile mass. · Integument:  Normal turgor. Warm, dry, without visible rash. · Neurological:  Oriented. Motor functions intact. Lab / Imaging Results   (All laboratory and radiology results have been personally reviewed by myself)  Labs:  No results found for this visit on 06/26/19. Imaging: All Radiology results interpreted by Radiologist unless otherwise noted. No orders to display       ED Course / Medical Decision Making   Medications - No data to display         Consults:   None    Procedures:   none    Medical Decision Making:    Patient is not hypoxic or tachycardic. No respiratory distress. Mild scattered expiratory wheezing. Patient states that she does have a nebulizer machine at home but is out of her nebulizer medication. That is ordered for her as well as a Z-Nate and prednisone. Patient advised return to the ER or urgent care for any worsening symptoms. Otherwise follow-up with PCP. Counseling:     The emergency provider has spoken with the patient and discussed todays results, in addition to providing specific details for the plan of care and counseling regarding the diagnosis and prognosis. Questions are answered at this time and they are agreeable with the plan. Assessment     1. Upper respiratory tract infection, unspecified type      Plan   Discharge to home  Patient condition is good    New Medications     New Prescriptions    ALBUTEROL (PROVENTIL) (2.5 MG/3ML) 0.083% NEBULIZER SOLUTION    Take 3 mLs by nebulization every 6 hours as needed for Wheezing    AZITHROMYCIN (ZITHROMAX Z-MARIO) 250 MG TABLET    Take 2 tabs on day one, followed by 1 tablet daily for 4 days. PREDNISONE (DELTASONE) 10 MG TABLET    Take 4 tablets by mouth daily for 5 days     Electronically signed by TAVARES Hood   DD: 6/26/19  **This report was transcribed using voice recognition software. Every effort was made to ensure accuracy; however, inadvertent computerized transcription errors may be present.   END OF ED PROVIDER NOTE         Delma Hood  06/26/19 1949

## 2019-06-26 NOTE — DISCHARGE INSTR - COC
Continuity of Care Form    Patient Name: Heather Thao   :  1960  MRN:  [id number]    Admit date:  2019  Discharge date:  ***    Code Status Order: No Order   Advance Directives:     Admitting Physician:  No admitting provider for patient encounter.   PCP: HERBERT Brooks CNP    Discharging Nurse: [facility] Unit/Room#:   Discharging Unit Phone Number: ***    Emergency Contact:   Extended Emergency Contact Information  Primary Emergency Contact: Dimple Heard  Address: [address] [place] [address] [address]  Phone: [phone]  Relation: Parent  Secondary Emergency Contact: [employer] [facility] Phone: [phone]  Relation: Parent    Past Surgical History:  Past Surgical History:   Procedure Laterality Date    COLONOSCOPY      HYSTERECTOMY      SINUS SURGERY      TONSILLECTOMY      TONSILLECTOMY         Immunization History:   Immunization History   Administered Date(s) Administered    Influenza Virus Vaccine 2017    Influenza, Triv, 3 Years and older, IM ([facility]fluria (5 yrs and older) 2016    Pneumococcal Polysaccharide ([id number]) 2017       Active Problems:  Patient Active Problem List   Diagnosis Code    Retrocalcaneal bursitis M77.50    Ankle pain M25.579    DDD (degenerative disc disease), cervical M50.30    DDD (degenerative disc disease), lumbar M51.36    Neural foraminal stenosis of cervical spine M99.81    Neural foraminal stenosis of lumbar spine M99.83    Facet syndrome, lumbar M47.816    Cervical facet syndrome M47.812    Osteoarthritis of both knees M17.0    Osteoarthritis of both hips M16.0    Osteoarthritis, shoulder, bilateral M19.019    Osteoarthritis of ankle, bilateral M19.079    Asthma, allergic J45.909    Type 2 diabetes mellitus with hyperosmolarity without coma, without long-term current use of insulin ([facility] E11.00    Essential hypertension I10  Morbid obesity with body mass index (BMI) of 40.0 to 44.9 in adult ([facility]) E66.01, Z68.41    Osteoarthritis of multiple joints M15.9    Epigastric pain R10.13    Hypovitaminosis D E55.9    Chronic pain syndrome G89.4    Lumbar spondylosis M47.816    Lumbar facet arthropathy M47.816    Primary osteoarthritis of both knees M17.0    Primary osteoarthritis of left shoulder M19.012    Chronic left shoulder pain M25.512, G89.29       Isolation/Infection:   Isolation          No Isolation            Nurse Assessment:  Last Vital Signs: /89   Pulse 97   Temp 98 °F (36.7 °C) (Oral)   Resp 20   SpO2 97%     Last documented pain score (0-10 scale):    Last Weight:   Wt Readings from Last 1 Encounters:   04/17/19 270 lb (122.5 kg)     Mental Status:  {IP PT MENTAL STATUS:20030}    IV Access:  { MEGGAN IV ACCESS:956459769}    Nursing Mobility/ADLs:  Walking   {P DME VUAB:116068600}  Transfer  {P DME BAFA:716404842}  Bathing  {P DME VGIB:376865731}  Dressing  {CHP DME OHNU:542291452}  Toileting  {CHP DME MGTT:285201736}  Feeding  {[facility] DME OGZD:429107823}  Med Admin  {P DME PNIV:394917729}  Med Delivery   {[facility] MED Delivery:287441828}    Wound Care Documentation and Therapy:        Elimination:  Continence:   · Bowel: {YES / ZS:15304}  · Bladder: {YES / IZ:57848}  Urinary Catheter: {Urinary Catheter:450980753}   Colostomy/Ileostomy/Ileal Conduit: {YES / AY:94768}       Date of Last BM: ***  No intake or output data in the 24 hours ending 06/26/19 1947  No intake/output data recorded.     Safety Concerns:     [address] Safety Concerns:509276337}    Impairments/Disabilities:      [address] Impairments/Disabilities:504438957}    Nutrition Therapy:  Current Nutrition Therapy:   [address] Diet List:205861794}    Routes of Feeding: {P DME Other Feedings:972966235}  Liquids: {Slp liquid thickness:25362}  Daily Fluid Restriction: {CHP DME Yes amt example:811617784}  Last Modified Barium Swallow with Video (Video Swallowing Test): {Done Not Done UORO:823583311}    Treatments at the Time of Hospital Discharge:   Respiratory Treatments: ***  Oxygen Therapy:  {Therapy; copd oxygen:82160}  Ventilator:    {MH CC Vent LBQE:715502687}    Rehab Therapies: {THERAPEUTIC INTERVENTION:7782619552}  Weight Bearing Status/Restrictions: { CC Weight Bearin}  Other Medical Equipment (for information only, NOT a DME order):  {EQUIPMENT:718276280}  Other Treatments: ***    Patient's personal belongings (please select all that are sent with patient):  {[facility] DME Belongings:213795461}    RN SIGNATURE:  {Esignature:742178538}    CASE MANAGEMENT/SOCIAL WORK SECTION    Inpatient Status Date: ***    Readmission Risk Assessment Score:  Readmission Risk              Risk of Unplanned Readmission:        0           Discharging to Facility/ Agency   · Name:   · Address:  · Phone:  · Fax:    Dialysis Facility (if applicable)   · Name:  · Address:  · Dialysis Schedule:  · Phone:  · Fax:    / signature: {Esignature:254567703}    PHYSICIAN SECTION    Prognosis: {Prognosis:9817270113}    Condition at Discharge: [address] [place] [address] Patient Condition:037800773}    Rehab Potential (if transferring to Rehab): {Prognosis:6779370950}    Recommended Labs or Other Treatments After Discharge: ***    Physician Certification: I certify the above information and transfer of Radha Hernandez  is necessary for the continuing treatment of the diagnosis listed and that she requires {Admit to Appropriate Level of Care:35929} for {GREATER/LESS:435381831} 30 days.      Update Admission H&P: {CHP DME Changes in VFSIM:446659537}    PHYSICIAN SIGNATURE:  {Esignature:502636666}

## 2019-09-10 ENCOUNTER — HOSPITAL ENCOUNTER (EMERGENCY)
Age: 59
Discharge: HOME | End: 2019-09-10
Payer: COMMERCIAL

## 2019-09-10 VITALS
HEART RATE: 93 BPM | DIASTOLIC BLOOD PRESSURE: 89 MMHG | WEIGHT: 290 LBS | BODY MASS INDEX: 44.09 KG/M2 | SYSTOLIC BLOOD PRESSURE: 167 MMHG | TEMPERATURE: 97.3 F | OXYGEN SATURATION: 98 % | RESPIRATION RATE: 20 BRPM

## 2019-09-10 DIAGNOSIS — Z76.0 ENCOUNTER FOR MEDICATION REFILL: Primary | ICD-10-CM

## 2019-09-10 DIAGNOSIS — I10 HTN (HYPERTENSION): ICD-10-CM

## 2019-09-10 DIAGNOSIS — J45.909 ASTHMA, UNSPECIFIED ASTHMA SEVERITY, UNSPECIFIED WHETHER COMPLICATED, UNSPECIFIED WHETHER PERSISTENT: ICD-10-CM

## 2019-09-10 PROCEDURE — 99212 OFFICE O/P EST SF 10 MIN: CPT

## 2019-09-10 PROCEDURE — 6370000000 HC RX 637 (ALT 250 FOR IP): Performed by: NURSE PRACTITIONER

## 2019-09-10 PROCEDURE — 94640 AIRWAY INHALATION TREATMENT: CPT

## 2019-09-10 RX ORDER — IPRATROPIUM BROMIDE AND ALBUTEROL SULFATE 2.5; .5 MG/3ML; MG/3ML
1 SOLUTION RESPIRATORY (INHALATION) ONCE
Status: COMPLETED | OUTPATIENT
Start: 2019-09-10 | End: 2019-09-10

## 2019-09-10 RX ORDER — LOSARTAN POTASSIUM 100 MG/1
100 TABLET ORAL DAILY
Qty: 7 TABLET | Refills: 0 | Status: SHIPPED | OUTPATIENT
Start: 2019-09-10 | End: 2022-01-31

## 2019-09-10 RX ORDER — GABAPENTIN 800 MG/1
800 TABLET ORAL 2 TIMES DAILY
Qty: 14 TABLET | Refills: 0 | Status: SHIPPED | OUTPATIENT
Start: 2019-09-10 | End: 2022-01-31

## 2019-09-10 RX ORDER — HYDROCHLOROTHIAZIDE 25 MG/1
25 TABLET ORAL DAILY
Qty: 7 TABLET | Refills: 0 | Status: SHIPPED | OUTPATIENT
Start: 2019-09-10 | End: 2021-11-13 | Stop reason: ALTCHOICE

## 2019-09-10 RX ORDER — ALBUTEROL SULFATE 2.5 MG/3ML
2.5 SOLUTION RESPIRATORY (INHALATION) EVERY 6 HOURS PRN
Qty: 120 EACH | Refills: 0 | Status: SHIPPED | OUTPATIENT
Start: 2019-09-10 | End: 2020-02-17 | Stop reason: ALTCHOICE

## 2019-09-10 RX ORDER — METHYLPREDNISOLONE 4 MG/1
TABLET ORAL
Qty: 21 TABLET | Status: SHIPPED | OUTPATIENT
Start: 2019-09-10 | End: 2019-09-16

## 2019-09-10 RX ADMIN — IPRATROPIUM BROMIDE AND ALBUTEROL SULFATE 1 AMPULE: .5; 3 SOLUTION RESPIRATORY (INHALATION) at 16:51

## 2019-09-10 ASSESSMENT — PAIN DESCRIPTION - PAIN TYPE: TYPE: ACUTE PAIN

## 2019-09-10 ASSESSMENT — PAIN SCALES - GENERAL: PAINLEVEL_OUTOF10: 8

## 2019-09-10 ASSESSMENT — PAIN DESCRIPTION - LOCATION: LOCATION: GENERALIZED

## 2019-09-10 ASSESSMENT — PAIN DESCRIPTION - DESCRIPTORS: DESCRIPTORS: ACHING

## 2020-02-17 ENCOUNTER — HOSPITAL ENCOUNTER (EMERGENCY)
Age: 60
Discharge: HOME | End: 2020-02-17
Payer: COMMERCIAL

## 2020-02-17 VITALS
BODY MASS INDEX: 41.18 KG/M2 | SYSTOLIC BLOOD PRESSURE: 145 MMHG | DIASTOLIC BLOOD PRESSURE: 89 MMHG | RESPIRATION RATE: 20 BRPM | HEIGHT: 69 IN | TEMPERATURE: 98.1 F | HEART RATE: 93 BPM | OXYGEN SATURATION: 98 % | WEIGHT: 278 LBS

## 2020-02-17 PROCEDURE — 6370000000 HC RX 637 (ALT 250 FOR IP): Performed by: NURSE PRACTITIONER

## 2020-02-17 PROCEDURE — 99213 OFFICE O/P EST LOW 20 MIN: CPT

## 2020-02-17 PROCEDURE — 6360000002 HC RX W HCPCS: Performed by: NURSE PRACTITIONER

## 2020-02-17 PROCEDURE — 94640 AIRWAY INHALATION TREATMENT: CPT

## 2020-02-17 RX ORDER — IPRATROPIUM BROMIDE AND ALBUTEROL SULFATE 2.5; .5 MG/3ML; MG/3ML
1 SOLUTION RESPIRATORY (INHALATION) ONCE
Status: COMPLETED | OUTPATIENT
Start: 2020-02-17 | End: 2020-02-17

## 2020-02-17 RX ORDER — IPRATROPIUM BROMIDE AND ALBUTEROL SULFATE 2.5; .5 MG/3ML; MG/3ML
1 SOLUTION RESPIRATORY (INHALATION) EVERY 6 HOURS PRN
Qty: 24 VIAL | Refills: 1 | Status: SHIPPED | OUTPATIENT
Start: 2020-02-17 | End: 2020-08-16 | Stop reason: SDUPTHER

## 2020-02-17 RX ORDER — DEXAMETHASONE SODIUM PHOSPHATE 10 MG/ML
10 INJECTION, SOLUTION INTRAMUSCULAR; INTRAVENOUS ONCE
Status: COMPLETED | OUTPATIENT
Start: 2020-02-17 | End: 2020-02-17

## 2020-02-17 RX ORDER — PREDNISONE 20 MG/1
40 TABLET ORAL DAILY
Qty: 10 TABLET | Refills: 0 | Status: SHIPPED | OUTPATIENT
Start: 2020-02-17 | End: 2020-02-22

## 2020-02-17 RX ADMIN — IPRATROPIUM BROMIDE AND ALBUTEROL SULFATE 1 AMPULE: .5; 3 SOLUTION RESPIRATORY (INHALATION) at 17:47

## 2020-02-17 RX ADMIN — DEXAMETHASONE SODIUM PHOSPHATE 10 MG: 10 INJECTION, SOLUTION INTRAMUSCULAR; INTRAVENOUS at 17:47

## 2020-02-17 ASSESSMENT — PAIN DESCRIPTION - PROGRESSION: CLINICAL_PROGRESSION: GRADUALLY WORSENING

## 2020-02-17 ASSESSMENT — PAIN DESCRIPTION - ONSET: ONSET: GRADUAL

## 2020-02-17 ASSESSMENT — PAIN SCALES - GENERAL: PAINLEVEL_OUTOF10: 7

## 2020-02-17 ASSESSMENT — PAIN DESCRIPTION - PAIN TYPE: TYPE: ACUTE PAIN

## 2020-02-17 ASSESSMENT — PAIN DESCRIPTION - FREQUENCY: FREQUENCY: CONTINUOUS

## 2020-02-17 ASSESSMENT — PAIN DESCRIPTION - DESCRIPTORS: DESCRIPTORS: ACHING

## 2020-02-17 ASSESSMENT — PAIN DESCRIPTION - LOCATION: LOCATION: GENERALIZED

## 2020-02-17 NOTE — ED PROVIDER NOTES
Department of Emergency Medicine   [facility]  Provider Note  Admit Date/RoomTime: 2/17/2020  4:49 PM  Room: 02/02    Chief Complaint   Generalized Body Aches; Shortness of Breath; Nasal Congestion; and Chest Congestion    History of Present Illness   Source of history provided by:  patient. History/Exam Limitations: none. Roseann Sneed is a 60 y.o. old female who has a past medical history of:   Past Medical History:   Diagnosis Date    Arthritis     Asthma     [facility]     Depressed     Diabetes mellitus ([facility]     Fibromyalgia     Hepatitis     HIV (human immunodeficiency virus infection) ([facility]     Hyperlipidemia     Hypertension     [facility]     Respiratory disease     Sleep apnea     Sleep apnea     presents to the urgent care center  by private vehicle, with complaints of cough and wheezing. She said is been going on for about a week. She said she has asthma she does 3 different inhalers and she said they are not really helping. She said she has body aches but she has fibromyalgia she does not believe that she has the flu she is not running a fever does not have any upper respiratory symptoms. ROS   Pertinent positives and negatives are stated within HPI, all other systems reviewed and are negative. Past Surgical History:   Procedure Laterality Date    COLONOSCOPY      HYSTERECTOMY      SINUS SURGERY      TONSILLECTOMY      TONSILLECTOMY     Social History:  reports that she has never smoked. She has never used smokeless tobacco. She reports that she does not drink alcohol or use drugs. Family History: family history includes Cancer in her sister; High Cholesterol in her father and mother; Hypertension in her mother; Osteoarthritis in her father, mother, and sister. Allergies: Amoxicillin;  Augmentin [amoxicillin-pot clavulanate]; and Coricidin d cold-flu-sinus [chlorphen-pe-acetaminophen]    Physical Exam           ED Triage Vitals [02/17/20 1709]   BP Temp Temp Source Pulse Resp SpO2 Height Weight   (!) 145/89 98.1 °F (36.7 °C) Oral 93 20 98 % 5' 9\" (1.753 m) 278 lb (126.1 kg)      Oxygen Saturation Interpretation: Normal.    Constitutional:  Alert, development consistent with age. HEENT:  NC/NT. Airway patent. Neck:  Normal ROM. Supple. Respiratory:  Breath sounds: equal bilaterally. Lung sounds: diminished breath sounds- throughout and wheezing- throughout. CV:  Regular rate and rhythm, normal heart sounds, without pathological murmurs, ectopy, gallops, or rubs. .  GI:  Abdomen Soft, nontender, good bowel sounds. No firm or pulsatile mass. Integument:  Normal turgor. Warm, dry, without visible rash. Neurological:  Oriented. Motor functions intact. Lab / Imaging Results   (All laboratory and radiology results have been personally reviewed by myself)  Labs:  No results found for this visit on 02/17/20. Imaging: All Radiology results interpreted by Radiologist unless otherwise noted. No orders to display       ED Course / Medical Decision Making     Medications   dexamethasone (PF) (DECADRON) injection 10 mg (10 mg Oral Given 2/17/20 1747)   ipratropium-albuterol (DUONEB) nebulizer solution 1 ampule (1 ampule Inhalation Given 2/17/20 1747)        Consult(s):   None  MDM:   Here with achiness all over but she says due to her fibromyalgia she says she has a flareup of her asthma she is wheezing and coughing. She does her inhaler but it has not helped. On exam she is diminished with wheezing throughout. I did do a DuoNeb and also gave her Decadron. Following the  treatment and she is now totally clear. She said she feels better her sats 98%. Iona Amour a prescription for the nebulizer she is done them in the past and they have helped better than her inhaler.   I did order her nebulizer with DuoNeb every 6 hours and use her Spiriva inhaler when she is doing the treatments and she was notified of that. She was advised to follow-up with her doctor. She was advised if she has any worsening symptoms she needs to go to the ED or return for reevaluation    Plan of Care: Normal progression of disease discussed. All questions answered. Explained the rationale for symptomatic treatment rather than use of an antibiotic. Instruction provided in the use of fluids, vaporizer, acetaminophen, and other OTC medication for symptom control. Extra fluids  Analgesics as needed, dose reviewed. Follow up as needed should symptoms fail to improve. Counseling:   I have spoken with the patient and discussed todays results, in addition to providing specific details for the plan of care and counseling regarding the diagnosis and prognosis. Questions are answered at this time and they are agreeable with the plan. Assessment      1. Uncomplicated asthma, unspecified asthma severity, unspecified whether persistent      Plan   Discharge to home and advised to contact [facility], [address] [place] [id number]  [phone]    Schedule an appointment as soon as possible for a visit      Patient condition is good    New Medications     Discharge Medication List as of 2/17/2020  6:14 PM      START taking these medications    Details   ipratropium-albuterol (DUONEB) 0.5-2.5 (3) MG/3ML SOLN nebulizer solution Take 3 mLs by nebulization every 6 hours as needed for Shortness of Breath, Disp-24 vial, R-1Print      predniSONE (DELTASONE) 20 MG tablet Take 2 tablets by mouth daily for 5 days Start on 2/18, Disp-10 tablet, R-0Print      Respiratory Therapy Supplies (FULL KIT NEBULIZER SET) MISC Disp-1 each, R-0, PrintUse as directed with nebulized medication. Electronically signed by HERBERT Bender CNP   DD: 2/17/20  **This report was transcribed using voice recognition software.  Every effort was made to ensure accuracy; however, inadvertent computerized transcription errors may be present.   END OF ED PROVIDER NOTE     HERBERT Wetzel CNP  02/17/20 [id number] [facility], HERBERT - CNP  02/18/20 0646

## 2020-03-14 ENCOUNTER — APPOINTMENT (OUTPATIENT)
Dept: GENERAL RADIOLOGY | Age: 60
End: 2020-03-14
Payer: COMMERCIAL

## 2020-03-14 ENCOUNTER — HOSPITAL ENCOUNTER (EMERGENCY)
Age: 60
Discharge: HOME | End: 2020-03-14
Attending: NURSE PRACTITIONER
Payer: COMMERCIAL

## 2020-03-14 VITALS
SYSTOLIC BLOOD PRESSURE: 152 MMHG | DIASTOLIC BLOOD PRESSURE: 80 MMHG | HEIGHT: 69 IN | OXYGEN SATURATION: 96 % | HEART RATE: 90 BPM | BODY MASS INDEX: 41.47 KG/M2 | WEIGHT: 280 LBS | RESPIRATION RATE: 20 BRPM | TEMPERATURE: 97.6 F

## 2020-03-14 PROCEDURE — 6370000000 HC RX 637 (ALT 250 FOR IP): Performed by: NURSE PRACTITIONER

## 2020-03-14 PROCEDURE — 6360000002 HC RX W HCPCS: Performed by: NURSE PRACTITIONER

## 2020-03-14 PROCEDURE — 71046 X-RAY EXAM CHEST 2 VIEWS: CPT

## 2020-03-14 PROCEDURE — 99212 OFFICE O/P EST SF 10 MIN: CPT

## 2020-03-14 PROCEDURE — 96372 THER/PROPH/DIAG INJ SC/IM: CPT

## 2020-03-14 RX ORDER — DEXAMETHASONE SODIUM PHOSPHATE 10 MG/ML
10 INJECTION, SOLUTION INTRAMUSCULAR; INTRAVENOUS ONCE
Status: COMPLETED | OUTPATIENT
Start: 2020-03-14 | End: 2020-03-14

## 2020-03-14 RX ORDER — PREDNISONE 20 MG/1
20 TABLET ORAL 2 TIMES DAILY
Qty: 10 TABLET | Refills: 0 | Status: SHIPPED | OUTPATIENT
Start: 2020-03-14 | End: 2020-03-19

## 2020-03-14 RX ORDER — IPRATROPIUM BROMIDE AND ALBUTEROL SULFATE 2.5; .5 MG/3ML; MG/3ML
1 SOLUTION RESPIRATORY (INHALATION) ONCE
Status: COMPLETED | OUTPATIENT
Start: 2020-03-14 | End: 2020-03-14

## 2020-03-14 RX ORDER — DOXYCYCLINE 100 MG/1
100 CAPSULE ORAL 2 TIMES DAILY
Qty: 20 CAPSULE | Refills: 0 | Status: SHIPPED | OUTPATIENT
Start: 2020-03-14 | End: 2020-03-24

## 2020-03-14 RX ADMIN — DEXAMETHASONE SODIUM PHOSPHATE 10 MG: 10 INJECTION, SOLUTION INTRAMUSCULAR; INTRAVENOUS at 18:04

## 2020-03-14 RX ADMIN — IPRATROPIUM BROMIDE AND ALBUTEROL SULFATE 1 AMPULE: .5; 3 SOLUTION RESPIRATORY (INHALATION) at 18:04

## 2020-03-14 ASSESSMENT — PAIN SCALES - GENERAL
PAINLEVEL_OUTOF10: 6
PAINLEVEL_OUTOF10: 8

## 2020-03-14 ASSESSMENT — PAIN DESCRIPTION - ONSET
ONSET: ON-GOING
ONSET: ON-GOING

## 2020-03-14 ASSESSMENT — PAIN DESCRIPTION - LOCATION
LOCATION: GENERALIZED
LOCATION: GENERALIZED

## 2020-03-14 ASSESSMENT — PAIN DESCRIPTION - FREQUENCY
FREQUENCY: CONTINUOUS
FREQUENCY: CONTINUOUS

## 2020-03-14 ASSESSMENT — PAIN - FUNCTIONAL ASSESSMENT: PAIN_FUNCTIONAL_ASSESSMENT: 0-10

## 2020-03-14 ASSESSMENT — PAIN DESCRIPTION - DESCRIPTORS
DESCRIPTORS: ACHING
DESCRIPTORS: ACHING

## 2020-03-14 ASSESSMENT — PAIN DESCRIPTION - PAIN TYPE
TYPE: ACUTE PAIN
TYPE: ACUTE PAIN

## 2020-03-14 ASSESSMENT — PAIN DESCRIPTION - PROGRESSION
CLINICAL_PROGRESSION: GRADUALLY WORSENING
CLINICAL_PROGRESSION: NOT CHANGED

## 2020-03-14 NOTE — ED PROVIDER NOTES
Department of Emergency Medicine   ED  Provider Note  Admit Date/RoomTime: 3/14/2020  5:12 PM  ED Room: 04/04  Chief Complaint:   Wheezing; Shortness of Breath; and Generalized Body Aches    History of Present Illness   Source of history provided by:  patient. History/Exam Limitations: none. Pepe Villavicencio is a 60 y.o. old female with a past medical history of:   Past Medical History:   Diagnosis Date    Arthritis     Asthma     [facility]     Depressed     Diabetes mellitus ([facility]     Fibromyalgia     Hepatitis     HIV (human immunodeficiency virus infection) ([facility]     Hyperlipidemia     Hypertension     MS (multiple sclerosis) ([facility]     Respiratory disease     Sleep apnea     Sleep apnea     presents to the urgent care ambulatory, for wheezing, which began 1 day(s) prior to arrival.  Since onset the symptoms have been persistent and moderate in severity. The symptoms are associated with cough. There has been NO fever. ROS   Pertinent positives and negatives are stated within HPI, all other systems reviewed and are negative. Past Surgical History:  has a past surgical history that includes sinus surgery; Hysterectomy; Tonsillectomy; Colonoscopy; and Tonsillectomy. Social History:  reports that she has never smoked. She has never used smokeless tobacco. She reports that she does not drink alcohol or use drugs. Family History: family history includes Cancer in her sister; High Cholesterol in her father and mother; Hypertension in her mother; Osteoarthritis in her father, mother, and sister. Allergies: Amoxicillin;  Augmentin [amoxicillin-pot clavulanate]; and Coricidin d cold-flu-sinus [chlorphen-pe-acetaminophen]    Physical Exam           ED Triage Vitals [03/14/20 1713]   BP Temp Temp Source Pulse Resp SpO2 Height Weight   (!) 152/80 97.6 °F (36.4 °C) Oral 90 20 96 % 5' 9\" (1.753 m) 280 lb (127 kg)      Oxygen Saturation Interpretation: symptoms fail to improve. Counseling: The emergency provider has spoken with the patient and discussed todays results, in addition to providing specific details for the plan of care and counseling regarding the diagnosis and prognosis. Questions are answered at this time and they are agreeable with the plan. Assessment     1. COPD exacerbation ([address]    2. Wheezing      Plan   Discharge to home  Patient condition is stable    New Medications     Discharge Medication List as of 3/14/2020  6:46 PM      START taking these medications    Details   predniSONE (DELTASONE) 20 MG tablet Take 1 tablet by mouth 2 times daily for 5 days, Disp-10 tablet, R-0Print      doxycycline monohydrate (MONODOX) 100 MG capsule Take 1 capsule by mouth 2 times daily for 10 days, Disp-20 capsule, R-0Print           Electronically signed by HERBERT Perdomo CNP   DD: 3/14/20  **This report was transcribed using voice recognition software. Every effort was made to ensure accuracy; however, inadvertent computerized transcription errors may be present.   END OF ED PROVIDER NOTE       HERBERT Perdomo CNP  03/14/20 1922

## 2020-03-30 ENCOUNTER — HOSPITAL ENCOUNTER (EMERGENCY)
Age: 60
Discharge: HOME | End: 2020-03-30
Payer: COMMERCIAL

## 2020-03-30 ENCOUNTER — APPOINTMENT (OUTPATIENT)
Dept: GENERAL RADIOLOGY | Age: 60
End: 2020-03-30
Payer: COMMERCIAL

## 2020-03-30 VITALS
RESPIRATION RATE: 20 BRPM | SYSTOLIC BLOOD PRESSURE: 129 MMHG | DIASTOLIC BLOOD PRESSURE: 84 MMHG | BODY MASS INDEX: 41.35 KG/M2 | HEART RATE: 99 BPM | TEMPERATURE: 98 F | OXYGEN SATURATION: 95 % | WEIGHT: 280 LBS

## 2020-03-30 PROCEDURE — 71046 X-RAY EXAM CHEST 2 VIEWS: CPT

## 2020-03-30 PROCEDURE — 99212 OFFICE O/P EST SF 10 MIN: CPT

## 2020-03-30 PROCEDURE — 96372 THER/PROPH/DIAG INJ SC/IM: CPT

## 2020-03-30 PROCEDURE — 6360000002 HC RX W HCPCS: Performed by: PHYSICIAN ASSISTANT

## 2020-03-30 RX ORDER — BENZONATATE 200 MG/1
200 CAPSULE ORAL 3 TIMES DAILY PRN
Qty: 15 CAPSULE | Refills: 0 | Status: SHIPPED | OUTPATIENT
Start: 2020-03-30 | End: 2020-08-16 | Stop reason: ALTCHOICE

## 2020-03-30 RX ORDER — DEXAMETHASONE SODIUM PHOSPHATE 10 MG/ML
10 INJECTION, SOLUTION INTRAMUSCULAR; INTRAVENOUS ONCE
Status: COMPLETED | OUTPATIENT
Start: 2020-03-30 | End: 2020-03-30

## 2020-03-30 RX ORDER — ASPIRIN 81 MG/1
81 TABLET, CHEWABLE ORAL DAILY
Qty: 30 TABLET | Refills: 0 | Status: SHIPPED | OUTPATIENT
Start: 2020-03-30

## 2020-03-30 RX ORDER — PREDNISONE 20 MG/1
TABLET ORAL
Qty: 8 TABLET | Refills: 0 | Status: SHIPPED | OUTPATIENT
Start: 2020-03-30 | End: 2020-08-16 | Stop reason: ALTCHOICE

## 2020-03-30 RX ADMIN — DEXAMETHASONE SODIUM PHOSPHATE 10 MG: 10 INJECTION, SOLUTION INTRAMUSCULAR; INTRAVENOUS at 17:46

## 2020-08-16 ENCOUNTER — HOSPITAL ENCOUNTER (EMERGENCY)
Age: 60
Discharge: HOME | End: 2020-08-16
Payer: COMMERCIAL

## 2020-08-16 ENCOUNTER — APPOINTMENT (OUTPATIENT)
Dept: GENERAL RADIOLOGY | Age: 60
End: 2020-08-16
Payer: COMMERCIAL

## 2020-08-16 VITALS
SYSTOLIC BLOOD PRESSURE: 118 MMHG | BODY MASS INDEX: 42.17 KG/M2 | WEIGHT: 262.4 LBS | HEART RATE: 86 BPM | HEIGHT: 66 IN | RESPIRATION RATE: 20 BRPM | DIASTOLIC BLOOD PRESSURE: 64 MMHG | TEMPERATURE: 98.1 F | OXYGEN SATURATION: 98 %

## 2020-08-16 PROCEDURE — 6360000002 HC RX W HCPCS: Performed by: PHYSICIAN ASSISTANT

## 2020-08-16 PROCEDURE — 71046 X-RAY EXAM CHEST 2 VIEWS: CPT

## 2020-08-16 PROCEDURE — 99212 OFFICE O/P EST SF 10 MIN: CPT

## 2020-08-16 PROCEDURE — 96372 THER/PROPH/DIAG INJ SC/IM: CPT

## 2020-08-16 RX ORDER — METHYLPREDNISOLONE SODIUM SUCCINATE 125 MG/2ML
125 INJECTION, POWDER, LYOPHILIZED, FOR SOLUTION INTRAMUSCULAR; INTRAVENOUS ONCE
Status: COMPLETED | OUTPATIENT
Start: 2020-08-16 | End: 2020-08-16

## 2020-08-16 RX ORDER — ALBUTEROL SULFATE 90 UG/1
2 AEROSOL, METERED RESPIRATORY (INHALATION) EVERY 4 HOURS PRN
Qty: 1 INHALER | Refills: 1 | Status: SHIPPED | OUTPATIENT
Start: 2020-08-16 | End: 2021-08-27 | Stop reason: ALTCHOICE

## 2020-08-16 RX ORDER — BENZONATATE 100 MG/1
100 CAPSULE ORAL 3 TIMES DAILY PRN
Qty: 21 CAPSULE | Refills: 0 | Status: SHIPPED | OUTPATIENT
Start: 2020-08-16 | End: 2020-08-23

## 2020-08-16 RX ORDER — AZITHROMYCIN 250 MG/1
TABLET, FILM COATED ORAL
Qty: 6 TABLET | Refills: 0 | Status: SHIPPED | OUTPATIENT
Start: 2020-08-16 | End: 2020-08-26

## 2020-08-16 RX ORDER — PREDNISONE 10 MG/1
40 TABLET ORAL DAILY
Qty: 12 TABLET | Refills: 0 | Status: SHIPPED | OUTPATIENT
Start: 2020-08-17 | End: 2020-08-20

## 2020-08-16 RX ORDER — IPRATROPIUM BROMIDE AND ALBUTEROL SULFATE 2.5; .5 MG/3ML; MG/3ML
1 SOLUTION RESPIRATORY (INHALATION) EVERY 6 HOURS PRN
Qty: 90 ML | Refills: 0 | Status: SHIPPED | OUTPATIENT
Start: 2020-08-16

## 2020-08-16 RX ADMIN — METHYLPREDNISOLONE SODIUM SUCCINATE 125 MG: 125 INJECTION, POWDER, FOR SOLUTION INTRAMUSCULAR; INTRAVENOUS at 17:02

## 2020-08-16 NOTE — ED PROVIDER NOTES
Department of Emergency Medicine   ED  Provider Note  Admit Date/RoomTime: 8/16/2020  3:31 PM  ED Room: 06/06   Chief Complaint   Shortness of Breath ( 1 week ); Chest Pain; and Cough (productive yellow in color)    History of Present Illness   Source of history provided by:  patient. History/Exam Limitations: none. Sheryl Saenz is a 63 y.o. old female who has a past medical history of:   Past Medical History:   Diagnosis Date    Arthritis     Asthma     [facility]     Depressed     [facility] mellitus ([facility]     Fibromyalgia     Hepatitis     [facility]     Hyperlipidemia     Hypertension     MS (multiple sclerosis) ([facility]     Respiratory disease     Sleep apnea     Sleep apnea     presents to the emergency department with a complaint of cough and chest congestion. Patient states she has asthma and COPD. She has had a productive cough for the past week. Light yellow-colored phlegm. She feels very congested in the chest.  Wheezing. She also feels like her sinuses are all congested. Sore throat from the coughing. Patient states she cannot find her 1 inhaler and needs medicine for her nebulizer machine. Patient denies fevers and chills. Denies loss of taste. Denies loss of smell. Patient denies nausea, vomiting, diarrhea. ROS   Pertinent positives and negatives are stated within HPI, all other systems reviewed and are negative. Past Surgical History:   Procedure Laterality Date    COLONOSCOPY      HYSTERECTOMY      SINUS SURGERY      TONSILLECTOMY      TONSILLECTOMY     Social History:  reports that she has never smoked. She has never used smokeless tobacco. She reports that she does not drink alcohol or use drugs.   Family History: family history includes Cancer in her sister; High Cholesterol in her father and mother; Hypertension in her mother; Osteoarthritis in her father, mother, and sister. Allergies: Amoxicillin; Augmentin [amoxicillin-pot clavulanate]; and Coricidin d cold-flu-sinus [chlorphen-pe-acetaminophen]    Physical Exam           ED Triage Vitals [08/16/20 1536]   BP Temp Temp Source Pulse Resp SpO2 Height Weight   118/64 98.1 °F (36.7 °C) Oral 86 20 98 % 5' 6\" (1.676 m) 262 lb 6.4 oz (119 kg)      Oxygen Saturation Interpretation: Normal.    General:  NAD. Alert and Oriented. Well-appearing. Skin:  Warm, dry. No rashes. Head:  Normocephalic. Atraumatic. Eyes:  EOMI. Conjunctiva normal.  ENT:  Oral mucosa moist.  Airway patent. Posterior pharynx without erythema, without swelling, without exudate. Bilateral TMs are obscured with cerumen. Neck:  Supple. Normal ROM. Respiratory:  No respiratory distress. No labored breathing. Lungs diminished with coarse rhonchi and wheezing throughout all lung fields. Cardiovascular:  Regular rate. No Murmur. No peripheral edema. Extremities warm and good color. Chest: Nontender to palpation. Extremities:  Normal ROM. Nontender to palpation. Atraumatic. Back:  Normal ROM. Nontender to palpation. Neuro:  Alert and Oriented to person, place, time and situation. Normal LOC. Moves all extremities. Speech fluent. Psych:  Calm and Cooperative. Normal thought process. Normal judgement. Lab / Imaging Results   (All laboratory and radiology results have been personally reviewed by myself)  Labs:  No results found for this visit on 08/16/20. Imaging: All Radiology results interpreted by Radiologist unless otherwise noted. XR CHEST (2 VW)   Final Result   No acute process.              ED Course / Medical Decision Making     Medications   methylPREDNISolone sodium (SOLU-MEDROL) injection 125 mg (has no administration in time range)        Consult(s):   None    Procedure(s):   none    Medical Decision Making:    Based on moderate  suspicion for pneumonia as per history/physical findings, imaging was done and confirms the above findings. Upper respiratory infection likely viral in etiology. Not hypoxic, nothing to suggest pneumonia. Patient is well appearing, non toxic and appropriate for outpatient management. Plan is for symptom management with PCP follow up. Plan of Care: Normal progression of disease discussed. All questions answered. Instruction provided in the use of fluids, vaporizer, acetaminophen, and other OTC medication for symptom control. Extra fluids  Analgesics as needed, dose reviewed. Follow up as needed should symptoms fail to improve. Counseling: The emergency provider has spoken with the patient and discussed todays results, in addition to providing specific details for the plan of care and counseling regarding the diagnosis and prognosis. Questions are answered at this time and they are agreeable with the plan. She was also advised to get coronavirus testing at any of the outpatient centers. Assessment      1. Acute asthmatic bronchitis      Plan   Discharge to home  Patient condition is good    New Medications     New Prescriptions    ALBUTEROL SULFATE HFA (PROVENTIL HFA) 108 (90 BASE) MCG/ACT INHALER    Inhale 2 puffs into the lungs every 4 hours as needed for Wheezing For pharmacies use only: May substitute Pro-Air HFA Inhaler if not covered by patient insurance with same administration instructions. AZITHROMYCIN (ZITHROMAX Z-MARIO) 250 MG TABLET    Take 2 tabs on day one, followed by 1 tablet daily for 4 days. BENZONATATE (TESSALON PERLES) 100 MG CAPSULE    Take 1 capsule by mouth 3 times daily as needed for Cough    PREDNISONE (DELTASONE) 10 MG TABLET    Take 4 tablets by mouth daily for 3 days     Electronically signed by TAVARES Bettencourt   DD: 8/16/20  **This report was transcribed using voice recognition software. Every effort was made to ensure accuracy; however, inadvertent computerized transcription errors may be present.   END OF ED PROVIDER NOTE       Jaquan Walker [place], [place]  08/16/20 [address] [employer], [place]  08/16/20 [id number]

## 2020-08-17 ENCOUNTER — CARE COORDINATION (OUTPATIENT)
Dept: CARE COORDINATION | Age: 60
End: 2020-08-17

## 2020-08-17 NOTE — CARE COORDINATION
Date/Time:  8/17/2020 12:35 PM  First attempt to reach patient by telephone. Left HIPPA compliant message requesting a return call. Will attempt to reach patient again.

## 2020-08-18 ENCOUNTER — CARE COORDINATION (OUTPATIENT)
Dept: CARE COORDINATION | Age: 60
End: 2020-08-18

## 2020-08-18 NOTE — CARE COORDINATION
2nd and final attempt to reach patient by telephone for ER F/U and Covid Call. Left HIPPA compliant message requesting a return call to discuss condition. Left message on voicemail for patient that this is the final transition Covid Call and to call their specialist/PCP for any problems or issues that may occur.

## 2020-08-31 ENCOUNTER — HOSPITAL ENCOUNTER (EMERGENCY)
Age: 60
Discharge: HOME | End: 2020-08-31
Payer: COMMERCIAL

## 2020-08-31 VITALS
DIASTOLIC BLOOD PRESSURE: 78 MMHG | OXYGEN SATURATION: 97 % | HEART RATE: 88 BPM | HEIGHT: 66 IN | BODY MASS INDEX: 41.78 KG/M2 | SYSTOLIC BLOOD PRESSURE: 127 MMHG | TEMPERATURE: 97.8 F | WEIGHT: 260 LBS | RESPIRATION RATE: 20 BRPM

## 2020-08-31 PROCEDURE — 99212 OFFICE O/P EST SF 10 MIN: CPT

## 2020-08-31 PROCEDURE — 6360000002 HC RX W HCPCS: Performed by: PHYSICIAN ASSISTANT

## 2020-08-31 PROCEDURE — 96372 THER/PROPH/DIAG INJ SC/IM: CPT

## 2020-08-31 RX ORDER — PREDNISONE 20 MG/1
TABLET ORAL
Qty: 8 TABLET | Refills: 0 | Status: SHIPPED | OUTPATIENT
Start: 2020-08-31 | End: 2020-09-25 | Stop reason: ALTCHOICE

## 2020-08-31 RX ORDER — METHYLPREDNISOLONE SODIUM SUCCINATE 125 MG/2ML
125 INJECTION, POWDER, LYOPHILIZED, FOR SOLUTION INTRAMUSCULAR; INTRAVENOUS ONCE
Status: COMPLETED | OUTPATIENT
Start: 2020-08-31 | End: 2020-08-31

## 2020-08-31 RX ADMIN — METHYLPREDNISOLONE SODIUM SUCCINATE 125 MG: 125 INJECTION, POWDER, FOR SOLUTION INTRAMUSCULAR; INTRAVENOUS at 19:03

## 2020-08-31 NOTE — ED PROVIDER NOTES
This is a 54-year-old female that presents to urgent care. She has history of asthma. She has been using her inhaler. She has been spraying her apartment for insects recently. It aggravated her lungs and now she has been having some wheezing there is been a cough. Nonproductive cough no fevers or chills. She was seen in urgent care several weeks ago for bronchitis. Currently she is in no acute distress she is talking without any difficulty. She does not appear to be short of breath at all. Review of Systems   Constitutional:        Pertinent positives and negatives are stated within HPI, all other systems reviewed and are negative. Physical Exam  Vitals signs and nursing note reviewed. Constitutional:       Appearance: She is well-developed. HENT:      Head: Normocephalic and atraumatic. Jaw: There is normal jaw occlusion. No trismus. Right Ear: Hearing, tympanic membrane, ear canal and external ear normal.      Left Ear: Hearing, tympanic membrane, ear canal and external ear normal.      Nose:      Right Sinus: No maxillary sinus tenderness or frontal sinus tenderness. Left Sinus: No maxillary sinus tenderness or frontal sinus tenderness. Mouth/Throat:      Pharynx: Oropharynx is clear. Uvula midline. No pharyngeal swelling, oropharyngeal exudate, posterior oropharyngeal erythema or uvula swelling. Eyes:      General: Lids are normal.      Conjunctiva/sclera: Conjunctivae normal.      Pupils: Pupils are equal, round, and reactive to light. Neck:      Musculoskeletal: Normal range of motion and neck supple. Cardiovascular:      Rate and Rhythm: Normal rate and regular rhythm. Heart sounds: Normal heart sounds. No murmur. Pulmonary:      Effort: Pulmonary effort is normal.      Breath sounds: Wheezing present. Abdominal:      General: Bowel sounds are normal.      Palpations: Abdomen is soft. Abdomen is not rigid. Tenderness:  There is no abdominal tenderness. There is no guarding or rebound. Skin:     General: Skin is warm and dry. Findings: No abrasion or rash. Neurological:      Mental Status: She is alert and oriented to person, place, and time. GCS: GCS eye subscore is 4. GCS verbal subscore is 5. GCS motor subscore is 6. Cranial Nerves: No cranial nerve deficit. Sensory: No sensory deficit. Coordination: Coordination normal.      Gait: Gait normal.         Procedures    MDM  Number of Diagnoses or Management Options  Diagnosis management comments: She does not appear to be ill here but sprain that sprain may have aggravated her asthma. Continue having her use her inhalers and breathing treatments I will give her a steroid here and steroids for home to use for the next couple days she does have some leftover cough medication she can take I told her to return if symptoms worsen. --------------------------------------------- PAST HISTORY ---------------------------------------------  Past Medical History:  has a past medical history of Arthritis, Asthma, [facility] Depressed, Diabetes mellitus ([facility] Fibromyalgia, Hepatitis, HIV (human immunodeficiency virus infection) ([facility] Hyperlipidemia, Hypertension, MS (multiple sclerosis) ([facility] Respiratory disease, Sleep apnea, and Sleep apnea. Past Surgical History:  has a past surgical history that includes sinus surgery; Hysterectomy; Tonsillectomy; Colonoscopy; and Tonsillectomy. Social History:  reports that she has never smoked. She has never used smokeless tobacco. She reports that she does not drink alcohol or use drugs. Family History: family history includes Cancer in her sister; High Cholesterol in her father and mother; Hypertension in her mother; Osteoarthritis in her father, mother, and sister. The patients home medications have been reviewed. Allergies: Amoxicillin;  Augmentin [amoxicillin-pot

## 2020-09-25 ENCOUNTER — APPOINTMENT (OUTPATIENT)
Dept: GENERAL RADIOLOGY | Age: 60
End: 2020-09-25
Payer: COMMERCIAL

## 2020-09-25 ENCOUNTER — HOSPITAL ENCOUNTER (EMERGENCY)
Age: 60
Discharge: LEFT BEFORE DISCHARGE | End: 2020-09-25
Payer: COMMERCIAL

## 2020-09-25 VITALS
BODY MASS INDEX: 43.39 KG/M2 | OXYGEN SATURATION: 93 % | WEIGHT: 270 LBS | SYSTOLIC BLOOD PRESSURE: 129 MMHG | DIASTOLIC BLOOD PRESSURE: 78 MMHG | HEART RATE: 102 BPM | RESPIRATION RATE: 20 BRPM | HEIGHT: 66 IN | TEMPERATURE: 98.6 F

## 2020-09-25 PROCEDURE — 71046 X-RAY EXAM CHEST 2 VIEWS: CPT

## 2020-09-25 PROCEDURE — 99212 OFFICE O/P EST SF 10 MIN: CPT

## 2020-09-25 ASSESSMENT — PAIN DESCRIPTION - ONSET: ONSET: GRADUAL

## 2020-09-25 ASSESSMENT — PAIN DESCRIPTION - FREQUENCY: FREQUENCY: CONTINUOUS

## 2020-09-25 ASSESSMENT — PAIN DESCRIPTION - PROGRESSION: CLINICAL_PROGRESSION: GRADUALLY WORSENING

## 2020-09-25 ASSESSMENT — PAIN SCALES - GENERAL: PAINLEVEL_OUTOF10: 8

## 2020-09-25 ASSESSMENT — PAIN DESCRIPTION - LOCATION: LOCATION: GENERALIZED

## 2020-09-25 ASSESSMENT — PAIN DESCRIPTION - PAIN TYPE: TYPE: ACUTE PAIN

## 2020-09-25 ASSESSMENT — PAIN DESCRIPTION - DESCRIPTORS: DESCRIPTORS: ACHING

## 2020-09-25 NOTE — ED PROVIDER NOTES
Department of Emergency . [facility] Urgent [facility]  Provider Note  Admit Date/RoomTime: 9/25/2020 10:57 AM  Room: 07/07    Chief Complaint   Cough; Shortness of Breath; Nasal Congestion; Chest Congestion; and Dizziness (States 3 days ago she \"passed out\" for a few seconds. )    History of Present Illness   Source of history provided by:  patient. History/Exam Limitations: none. Kan Lino is a 63 y.o. old female who has a past medical history of:   Past Medical History:   Diagnosis Date    Arthritis     Asthma     [facility]     Depressed     Diabetes mellitus ([facility]     Fibromyalgia     Hepatitis     [facility]     Hyperlipidemia     Hypertension     MS [facility]multiple sclerosis) [facility]     Respiratory disease     Sleep apnea     Sleep apnea     presents to the urgent care center  by private vehicle, with complaints of cough, wheezing, and shortness of breath. She reports that she is also dizzy and actually she told me she passed out 3 times over the past 3 days because of coughing. She said her friend had to shake her. She says that she does not have a fever, she does not have body aches. She said she does not have a sore throat. She does nebulizers at home. ROS   Pertinent positives and negatives are stated within HPI, all other systems reviewed and are negative. Past Surgical History:   Procedure Laterality Date    COLONOSCOPY      HYSTERECTOMY      SINUS SURGERY      TONSILLECTOMY      TONSILLECTOMY     Social History:  reports that she has never smoked. She has never used smokeless tobacco. She reports that she does not drink alcohol or use drugs. Family History: family history includes Cancer in her sister; High Cholesterol in her father and mother; Hypertension in her mother; Osteoarthritis in her father, mother, and sister. Allergies: Amoxicillin;  Augmentin [amoxicillin-pot clavulanate]; and Coricidin d cold-flu-sinus [chlorphen-pe-acetaminophen]    Physical Exam           ED Triage Vitals [09/25/20 1101]   BP Temp Temp Source Pulse Resp SpO2 Height Weight   129/78 98.6 °F (37 °C) Infrared 102 20 93 % 5' 6\" (1.676 m) 270 lb (122.5 kg)      Oxygen Saturation Interpretation: I rechecked a Sat in the room it was 92%. Constitutional:  Alert, development consistent with age. HEENT:  NC/NT. Airway patent. Neck:  Normal ROM. Supple. Respiratory:          Lung sounds: wheezing  CV:  Regular , tachycardia  GI:  Abdomen Soft, nontender, good bowel sounds. No firm or pulsatile mass. Integument:  Normal turgor. Warm, dry, without visible rash. Neurological:  Oriented. Motor functions intact. Lab / Imaging Results   (All laboratory and radiology results have been personally reviewed by myself)  Labs:  No results found for this visit on 09/25/20. Imaging: All Radiology results interpreted by Radiologist unless otherwise noted. XR CHEST (2 VW)   Final Result   No acute process. ED Course / Medical Decision Making   Medications - No data to display     Consult(s):   None  MDM:    I told the patient that her heart rate is too fast her oxygen levels on the low end and with passing out 3 times that she needs to go to the emergency department. Patient's refusing to go to the emergency department. I did tell her that with these findings and also the fact that she is wheezing throughout that she needs further work-up than what can be done here and also if she is passing out from coughing then she is is sicker than usual for her. She  said that the other person that when she was with had to shaker but now when I told her that I would  Recommend she go to the ED,  she said that she did not totally pass out. I did do a chest x-ray and it was negative, She said that she was already tested for corona.      I did recheck her heart rate I got 108 and her sat was 92%.  When I entered the room she was sleeping in her chair and I had to awaken her. I did tell her that I would advise her to go to the emergency department with her vitals not being normal this time and the fact that she passed out 3 times from coughing. Her friend will drive      Assessment      1. Cough    2. Wheezing    3. Syncope and collapse    4. Tachycardia      Plan   referrred to the ED for evaluation  New Medications     Discharge Medication List as of 9/25/2020 11:50 AM        Electronically signed by HERBERT Craven CNP   DD: 9/25/20  **This report was transcribed using voice recognition software. Every effort was made to ensure accuracy; however, inadvertent computerized transcription errors may be present.   END OF ED PROVIDER NOTE     HERBERT Craven CNP  09/25/20 [id number]

## 2020-09-25 NOTE — ED NOTES
Went in room to give Discharge Instructions and to instruct patient to go to ER for further evaluation and treatment and she was not in room.      BRITNEY Callaway  41/50/32 600 Kelsi Hogan  38/36/75 [id number]

## 2020-11-03 PROBLEM — M47.816 LUMBAR FACET ARTHROPATHY: Status: RESOLVED | Noted: 2018-06-21 | Resolved: 2020-11-03

## 2021-04-19 ENCOUNTER — TELEPHONE (OUTPATIENT)
Dept: SLEEP CENTER | Age: 61
End: 2021-04-19

## 2021-04-26 ENCOUNTER — TELEPHONE (OUTPATIENT)
Dept: SLEEP CENTER | Age: 61
End: 2021-04-26

## 2021-05-06 ENCOUNTER — HOSPITAL ENCOUNTER (OUTPATIENT)
Age: 61
Discharge: HOME | End: 2021-05-08
Payer: COMMERCIAL

## 2021-05-06 ENCOUNTER — HOSPITAL ENCOUNTER (OUTPATIENT)
Dept: GENERAL RADIOLOGY | Age: 61
Discharge: HOME | End: 2021-05-08
Payer: COMMERCIAL

## 2021-05-06 DIAGNOSIS — J44.9 OBSTRUCTIVE CHRONIC BRONCHITIS WITHOUT EXACERBATION (HCC): ICD-10-CM

## 2021-05-06 PROCEDURE — 71046 X-RAY EXAM CHEST 2 VIEWS: CPT

## 2021-05-20 ENCOUNTER — APPOINTMENT (OUTPATIENT)
Dept: GENERAL RADIOLOGY | Age: 61
End: 2021-05-20
Payer: COMMERCIAL

## 2021-05-20 ENCOUNTER — HOSPITAL ENCOUNTER (EMERGENCY)
Age: 61
Discharge: HOME | End: 2021-05-20
Payer: COMMERCIAL

## 2021-05-20 VITALS
SYSTOLIC BLOOD PRESSURE: 110 MMHG | TEMPERATURE: 98.2 F | RESPIRATION RATE: 20 BRPM | HEART RATE: 81 BPM | DIASTOLIC BLOOD PRESSURE: 72 MMHG | BODY MASS INDEX: 40.18 KG/M2 | HEIGHT: 66 IN | WEIGHT: 250 LBS | OXYGEN SATURATION: 98 %

## 2021-05-20 DIAGNOSIS — K59.00 CONSTIPATION, UNSPECIFIED CONSTIPATION TYPE: Primary | ICD-10-CM

## 2021-05-20 LAB
BASOPHILS ABSOLUTE: 0.03 E9/L (ref 0–0.2)
BASOPHILS RELATIVE PERCENT: 0.4 % (ref 0–2)
EOSINOPHILS ABSOLUTE: 0.2 E9/L (ref 0.05–0.5)
EOSINOPHILS RELATIVE PERCENT: 2.4 % (ref 0–6)
GFR AFRICAN AMERICAN: >60
GFR NON-AFRICAN AMERICAN: >60 ML/MIN/1.73
GLUCOSE BLD-MCNC: 142 MG/DL (ref 74–99)
HCT VFR BLD CALC: 39.9 % (ref 34–48)
HEMOGLOBIN: 12.5 G/DL (ref 11.5–15.5)
IMMATURE GRANULOCYTES #: 0.06 E9/L
IMMATURE GRANULOCYTES %: 0.7 % (ref 0–5)
LYMPHOCYTES ABSOLUTE: 1.44 E9/L (ref 1.5–4)
LYMPHOCYTES RELATIVE PERCENT: 17 % (ref 20–42)
MCH RBC QN AUTO: 26.7 PG (ref 26–35)
MCHC RBC AUTO-ENTMCNC: 31.3 % (ref 32–34.5)
MCV RBC AUTO: 85.3 FL (ref 80–99.9)
MONOCYTES ABSOLUTE: 0.57 E9/L (ref 0.1–0.95)
MONOCYTES RELATIVE PERCENT: 6.7 % (ref 2–12)
NEUTROPHILS ABSOLUTE: 6.19 E9/L (ref 1.8–7.3)
NEUTROPHILS RELATIVE PERCENT: 72.8 % (ref 43–80)
PDW BLD-RTO: 13.3 FL (ref 11.5–15)
PERFORMED ON: ABNORMAL
PLATELET # BLD: 382 E9/L (ref 130–450)
PMV BLD AUTO: 8.9 FL (ref 7–12)
POC CHLORIDE: 103 MMOL/L (ref 100–108)
POC CREATININE: 0.7 MG/DL (ref 0.5–1)
POC POTASSIUM: 3.9 MMOL/L (ref 3.5–5)
POC SODIUM: 141 MMOL/L (ref 132–146)
RBC # BLD: 4.68 E12/L (ref 3.5–5.5)
WBC # BLD: 8.5 E9/L (ref 4.5–11.5)

## 2021-05-20 PROCEDURE — 82947 ASSAY GLUCOSE BLOOD QUANT: CPT

## 2021-05-20 PROCEDURE — 84295 ASSAY OF SERUM SODIUM: CPT

## 2021-05-20 PROCEDURE — 74019 RADEX ABDOMEN 2 VIEWS: CPT

## 2021-05-20 PROCEDURE — 82435 ASSAY OF BLOOD CHLORIDE: CPT

## 2021-05-20 PROCEDURE — 36415 COLL VENOUS BLD VENIPUNCTURE: CPT

## 2021-05-20 PROCEDURE — 99211 OFF/OP EST MAY X REQ PHY/QHP: CPT

## 2021-05-20 PROCEDURE — 82565 ASSAY OF CREATININE: CPT

## 2021-05-20 PROCEDURE — 85025 COMPLETE CBC W/AUTO DIFF WBC: CPT

## 2021-05-20 PROCEDURE — 84132 ASSAY OF SERUM POTASSIUM: CPT

## 2021-05-20 PROCEDURE — 6370000000 HC RX 637 (ALT 250 FOR IP): Performed by: PHYSICIAN ASSISTANT

## 2021-05-20 RX ORDER — DICYCLOMINE HCL 20 MG
20 TABLET ORAL EVERY 6 HOURS PRN
Qty: 12 TABLET | Refills: 0 | Status: SHIPPED | OUTPATIENT
Start: 2021-05-20 | End: 2021-11-13 | Stop reason: ALTCHOICE

## 2021-05-20 RX ORDER — ONDANSETRON 4 MG/1
4 TABLET, ORALLY DISINTEGRATING ORAL ONCE
Status: COMPLETED | OUTPATIENT
Start: 2021-05-20 | End: 2021-05-20

## 2021-05-20 RX ORDER — SENNOSIDES 8.6 MG
2 TABLET ORAL DAILY PRN
Qty: 12 TABLET | Refills: 0 | Status: SHIPPED | OUTPATIENT
Start: 2021-05-20

## 2021-05-20 RX ORDER — ONDANSETRON 4 MG/1
4 TABLET, ORALLY DISINTEGRATING ORAL EVERY 8 HOURS PRN
Qty: 10 TABLET | Refills: 0 | Status: SHIPPED | OUTPATIENT
Start: 2021-05-20 | End: 2022-05-20

## 2021-05-20 RX ADMIN — ONDANSETRON 4 MG: 4 TABLET, ORALLY DISINTEGRATING ORAL at 19:31

## 2021-05-20 RX ADMIN — LIDOCAINE HYDROCHLORIDE: 20 SOLUTION ORAL; TOPICAL at 19:31

## 2021-05-20 ASSESSMENT — PAIN DESCRIPTION - LOCATION: LOCATION: ABDOMEN

## 2021-05-20 ASSESSMENT — PAIN DESCRIPTION - FREQUENCY: FREQUENCY: CONTINUOUS

## 2021-05-20 ASSESSMENT — PAIN DESCRIPTION - PAIN TYPE: TYPE: ACUTE PAIN

## 2021-05-20 ASSESSMENT — PAIN DESCRIPTION - ORIENTATION: ORIENTATION: RIGHT;UPPER

## 2021-05-20 ASSESSMENT — PAIN SCALES - GENERAL: PAINLEVEL_OUTOF10: 8

## 2021-05-20 ASSESSMENT — PAIN DESCRIPTION - ONSET: ONSET: AWAKENED FROM SLEEP

## 2021-05-20 NOTE — ED PROVIDER NOTES
[address] [facility]  Department of Emergency Medicine  UC Encounter Note  21   7:05 PM EDT      NAME: Caden Veronica  :  1960  MRN:  [id number]    Chief Complaint: Abdominal Pain (Having right upper abdominal pain. c/o nausea and vomiting.)      This is a 27-year-old female the presents to urgent care complaining of a possible food poisoning episode she has been having some nausea vomiting and abdominal cramping. She denies any chest pain or shortness of breath. No back pain no urinary symptoms. On first contact patient she appears to be in no acute distress. Review of Systems  Pertinent positives and negatives are stated within HPI, all other systems reviewed and are negative. Physical Exam  Vitals and nursing note reviewed. Constitutional:       Appearance: She is well-developed. HENT:      Head: Normocephalic and atraumatic. Right Ear: Hearing and external ear normal.      Left Ear: Hearing and external ear normal.      Nose: Nose normal.      Mouth/Throat:      Pharynx: Uvula midline. Eyes:      General: Lids are normal.      Conjunctiva/sclera: Conjunctivae normal.      Pupils: Pupils are equal, round, and reactive to light. Cardiovascular:      Rate and Rhythm: Normal rate and regular rhythm. Heart sounds: Normal heart sounds. No murmur heard. Pulmonary:      Effort: Pulmonary effort is normal.      Breath sounds: Normal breath sounds. Abdominal:      General: Bowel sounds are normal.      Palpations: Abdomen is soft. Abdomen is not rigid. Tenderness: There is generalized abdominal tenderness. There is no right CVA tenderness, left CVA tenderness, guarding or rebound. Musculoskeletal:      Cervical back: Normal range of motion and neck supple. Skin:     General: Skin is warm and dry. Findings: No abrasion or rash. Neurological:      Mental Status: She is alert and oriented to person, place, and time.       GCS: GCS E12/L    Hemoglobin 12.5 11.5 - 15.5 g/dL    Hematocrit 39.9 34.0 - 48.0 %    MCV 85.3 80.0 - 99.9 fL    MCH 26.7 26.0 - 35.0 pg    MCHC 31.3 (L) 32.0 - 34.5 %    RDW 13.3 11.5 - 15.0 fL    Platelets 464 406 - 473 E9/L    MPV 8.9 7.0 - 12.0 fL    Neutrophils % 72.8 43.0 - 80.0 %    Immature Granulocytes % 0.7 0.0 - 5.0 %    Lymphocytes % 17.0 (L) 20.0 - 42.0 %    Monocytes % 6.7 2.0 - 12.0 %    Eosinophils % 2.4 0.0 - 6.0 %    Basophils % 0.4 0.0 - 2.0 %    Neutrophils Absolute 6.19 1.80 - 7.30 E9/L    Immature Granulocytes # 0.06 E9/L    Lymphocytes Absolute 1.44 (L) 1.50 - 4.00 E9/L    Monocytes Absolute 0.57 0.10 - 0.95 E9/L    Eosinophils Absolute 0.20 0.05 - 0.50 E9/L    Basophils Absolute 0.03 0.00 - 0.20 E9/L   POCT Venous   Result Value Ref Range    POC Sodium 141 132 - 146 mmol/L    POC Potassium 3.9 3.5 - 5.0 mmol/L    POC Chloride 103 100 - 108 mmol/L    POC Glucose 142 (H) 74 - 99 mg/dl    POC Creatinine 0.7 0.5 - 1.0 mg/dL    GFR Non-African American >60 >=60 mL/min/1.73    GFR  >60     Performed on SEE BELOW      XR ABDOMEN (2 VIEWS)   Final Result   1. Moderate amount of stool throughout colon. 2.  No bowel obstruction or evidence of pneumoperitoneum. ------------------------- NURSING NOTES AND VITALS REVIEWED ---------------------------   The nursing notes within the ED encounter and vital signs as below have been reviewed. /72   Pulse 81   Temp 98.2 °F (36.8 °C) (Infrared)   Resp 20   Ht 5' 6\" (1.676 m)   Wt 250 lb (113.4 kg)   SpO2 98%   BMI 40.35 kg/m²   Oxygen Saturation Interpretation: Normal      ------------------------------------------ PROGRESS NOTES ------------------------------------------   I have spoken with the patient and discussed todays results, in addition to providing specific details for the plan of care and counseling regarding the diagnosis and prognosis.   Their questions are answered at this time and they are agreeable with the plan.      --------------------------------- ADDITIONAL PROVIDER NOTES ---------------------------------     This patient is stable for discharge. I have shared the specific conditions for return, as well as the importance of follow-up. * NOTE: This report was transcribed using voice recognition software. Every effort was made to ensure accuracy; however, inadvertent computerized transcription errors may be present.    --------------------------------- IMPRESSION AND DISPOSITION ---------------------------------    IMPRESSION  1.  Constipation, unspecified constipation type        DISPOSITION  Disposition: Discharge to home  Patient condition is good       Francisca Boswell PA-C  05/20/21 2010

## 2021-05-26 ENCOUNTER — TELEPHONE (OUTPATIENT)
Dept: FAMILY MEDICINE CLINIC | Age: 61
End: 2021-05-26

## 2021-05-26 NOTE — TELEPHONE ENCOUNTER
----- Message from Jim Hawkins MD sent at 5/25/2021  1:40 PM EDT -----  CALL PATIENT FOR APPOINTMENT IN 1 WEEK . IF PATIENT DECLINES, RELEASE FROM CARE.

## 2021-06-18 ENCOUNTER — HOSPITAL ENCOUNTER (EMERGENCY)
Age: 61
Discharge: HOME | End: 2021-06-18
Payer: COMMERCIAL

## 2021-06-18 VITALS
HEART RATE: 88 BPM | TEMPERATURE: 97.3 F | DIASTOLIC BLOOD PRESSURE: 76 MMHG | WEIGHT: 250 LBS | OXYGEN SATURATION: 99 % | BODY MASS INDEX: 40.35 KG/M2 | RESPIRATION RATE: 16 BRPM | SYSTOLIC BLOOD PRESSURE: 131 MMHG

## 2021-06-18 DIAGNOSIS — R60.0 LEG EDEMA: Primary | ICD-10-CM

## 2021-06-18 PROCEDURE — 99211 OFF/OP EST MAY X REQ PHY/QHP: CPT

## 2021-06-18 RX ORDER — IBUPROFEN 600 MG/1
600 TABLET ORAL EVERY 6 HOURS PRN
Qty: 20 TABLET | Refills: 0 | Status: SHIPPED | OUTPATIENT
Start: 2021-06-18

## 2021-06-18 RX ORDER — FUROSEMIDE 20 MG/1
20 TABLET ORAL DAILY
Qty: 5 TABLET | Refills: 0 | Status: SHIPPED | OUTPATIENT
Start: 2021-06-18 | End: 2021-07-05 | Stop reason: ALTCHOICE

## 2021-06-18 NOTE — ED PROVIDER NOTES
mother; Osteoarthritis in her father, mother, and sister. Allergies: Amoxicillin, Augmentin [amoxicillin-pot clavulanate], Clavulanic acid, and Chlorphen-pe-acetaminophen    Physical Exam   Oxygen Saturation Interpretation: Normal.   ED Triage Vitals [06/18/21 1716]   BP Temp Temp src Pulse Resp SpO2 Height Weight   131/76 97.3 °F (36.3 °C) -- 88 16 99 % -- 250 lb (113.4 kg)       Physical Exam  · Constitutional/General: Alert and oriented x3, well appearing, non toxic in NAD  · HEENT:  NC/NT. Clear conjunctiva    · Neck: Supple, full ROM,   · Respiratory:    Diminished breath sounds  · CV:  Regular rate. Regular rhythm right foot and ankle 2+ edema no calf tenderness no other swelling in the leg left foot 1+  · Musculoskeletal: Moves all extremities x 4.   · Integument: skin warm and dry. No rashes. · Lymphatic: no lymphadenopathy noted  · Neurologic: GCS 15, no focal deficits, symmetric strength 5/5 in the upper and lower extremities bilaterally  · Psychiatric: Normal Affect    Lab / Imaging Results   (All laboratory and radiology results have been personally reviewed by myself)  Labs:  No results found for this visit on 06/18/21. Imaging: All Radiology results interpreted by Radiologist unless otherwise noted. No orders to display       ED Course / Medical Decision Making   Medications - No data to display           MDM:   She was on spironolactone which did not help and then they switched her to hydrochlorothiazide she said it did not help --she does have slight swelling in the right foot about a 2+ left 1 is about a 1+ no calf pain or swelling in either leg no chest pain no increased shortness of breath. Patient wants a prescription for Lasix -- I did put her on Lasix for 5 days 20 mg until she sees her PCP I told her it would be up to the PCP whether they want her to stay on that if they want her to go back to the other diuretic.  If she has any worsening symptoms such as chest pain or shortness of breath she needs to go to the ED. Assessment      1. Leg edema      Plan   Discharge to home and advised to contact Nael Villalba, [facility] [place] [id number] [phone]    In 5 days     Patient condition is good    New Medications     New Prescriptions    FUROSEMIDE (LASIX) 20 MG TABLET    Take 1 tablet by mouth daily for 5 days     Electronically signed by HERBERT Lai CNP   DD: 6/18/21  **This report was transcribed using voice recognition software. Every effort was made to ensure accuracy; however, inadvertent computerized transcription errors may be present.   END OF ED PROVIDER NOTE     HERBERT Lai CNP  06/18/21 8000

## 2021-07-05 ENCOUNTER — HOSPITAL ENCOUNTER (EMERGENCY)
Age: 61
Discharge: HOME | End: 2021-07-05
Payer: COMMERCIAL

## 2021-07-05 VITALS
WEIGHT: 260 LBS | TEMPERATURE: 98.6 F | BODY MASS INDEX: 40.81 KG/M2 | HEART RATE: 84 BPM | OXYGEN SATURATION: 98 % | SYSTOLIC BLOOD PRESSURE: 119 MMHG | DIASTOLIC BLOOD PRESSURE: 72 MMHG | RESPIRATION RATE: 20 BRPM | HEIGHT: 67 IN

## 2021-07-05 DIAGNOSIS — J45.909 UNCOMPLICATED ASTHMA, UNSPECIFIED ASTHMA SEVERITY, UNSPECIFIED WHETHER PERSISTENT: ICD-10-CM

## 2021-07-05 DIAGNOSIS — R60.0 LEG EDEMA: Primary | ICD-10-CM

## 2021-07-05 PROCEDURE — 99211 OFF/OP EST MAY X REQ PHY/QHP: CPT

## 2021-07-05 RX ORDER — FUROSEMIDE 40 MG/1
40 TABLET ORAL DAILY
Qty: 5 TABLET | Refills: 0 | Status: SHIPPED | OUTPATIENT
Start: 2021-07-05 | End: 2021-11-13 | Stop reason: ALTCHOICE

## 2021-07-05 RX ORDER — PREDNISONE 20 MG/1
40 TABLET ORAL DAILY
Qty: 10 TABLET | Refills: 0 | Status: SHIPPED | OUTPATIENT
Start: 2021-07-05 | End: 2021-07-10

## 2021-07-05 ASSESSMENT — PAIN DESCRIPTION - ONSET: ONSET: ON-GOING

## 2021-07-05 ASSESSMENT — PAIN DESCRIPTION - PROGRESSION: CLINICAL_PROGRESSION: GRADUALLY WORSENING

## 2021-07-05 ASSESSMENT — PAIN SCALES - GENERAL: PAINLEVEL_OUTOF10: 7

## 2021-07-05 ASSESSMENT — PAIN DESCRIPTION - LOCATION: LOCATION: GENERALIZED

## 2021-07-05 ASSESSMENT — PAIN DESCRIPTION - FREQUENCY: FREQUENCY: CONTINUOUS

## 2021-07-05 ASSESSMENT — PAIN DESCRIPTION - DESCRIPTORS: DESCRIPTORS: ACHING

## 2021-07-05 ASSESSMENT — PAIN DESCRIPTION - PAIN TYPE: TYPE: ACUTE PAIN

## 2021-07-05 ASSESSMENT — PAIN - FUNCTIONAL ASSESSMENT: PAIN_FUNCTIONAL_ASSESSMENT: ACTIVITIES ARE NOT PREVENTED

## 2021-07-06 NOTE — ED PROVIDER NOTES
Department of Emergency Medicine  [address] [place] [address]  Provider Note  Admit Date/Time: 2021  6:31 PM  Room:   NAME: Kurtis Villa  : 1960  MRN: [id number]     Chief Complaint:  Leg Swelling (BLE's)    History of Present Illness        Kurtis Villa is a 57 y.o. female who has a past medical history of:   Past Medical History:   Diagnosis Date    Arthritis     Asthma     [facility]     Depressed     [facility] mellitus [facility]     Fibromyalgia     Hepatitis     HIV [facility] immunodeficiency virus [facility]) [facility]     Hyperlipidemia     Hypertension     MS (multiple sclerosis) ([facility]     Respiratory disease     Sleep apnea     Sleep apnea     presents to the urgent care center by private car for evaluation. She is having trouble with her feet swelling. She was put on some Lasix last month and it helped temporarily but she feels that there is swelling again she also has a flareup of her asthma she said she is in a hot apartment with just a fan does not have air conditioning. She is denying any chest pain or fever denies any other complaints. She said this is typical problems that she has. SHe denies any calf pain. ROS    Pertinent positives and negatives are stated within HPI, all other systems reviewed and are negative. Past Surgical History:   Procedure Laterality Date    COLONOSCOPY      HYSTERECTOMY      SINUS SURGERY      TONSILLECTOMY      TONSILLECTOMY     Social History:  reports that she has never smoked. She has never used smokeless tobacco. She reports that she does not drink alcohol and does not use drugs. Family History: family history includes Cancer in her sister; High Cholesterol in her father and mother; Hypertension in her mother; Osteoarthritis in her father, mother, and sister.   Allergies: Amoxicillin, Augmentin [amoxicillin-pot clavulanate], Clavulanic acid, and Chlorphen-pe-acetaminophen    Physical Exam   Oxygen Saturation Interpretation: Normal.   ED Triage Vitals [07/05/21 1832]   BP Temp Temp Source Pulse Resp SpO2 Height Weight   119/72 98.6 °F (37 °C) Temporal 84 20 98 % 5' 7\" (1.702 m) 260 lb (117.9 kg)       Physical Exam  · Constitutional/General: Alert and oriented x3, well appearing, non toxic in NAD  · HEENT:  NC/NT. Clear conjunctiva,  Airway patent. · Neck: Supple, full ROM,   · Respiratory: Diminished with expiratory wheezing   · CV:  Regular rate. Regular rhythm. No murmurs, gallops, or rubs. 2+ distal pulses 2+ edema in the bilateral  feet and ankles no calf tenderness or edema Homans negative  GI:  Abdomen Soft, Non tender, Non distended. +BS. No rebound, guarding, or rigidity. No pulsatile masses. · Musculoskeletal: Moves all extremities x 4.  · Integument: skin warm and dry. No rashes. · Lymphatic: no lymphadenopathy noted  · Neurologic: GCS 15, no focal deficits, symmetric strength 5/5 in the upper and lower extremities bilaterally  · Psychiatric: Normal Affect    Lab / Imaging Results   (All laboratory and radiology results have been personally reviewed by myself)  Labs:  No results found for this visit on 07/05/21. Imaging: All Radiology results interpreted by Radiologist unless otherwise noted. No orders to display       ED Course / Medical Decision Making   Medications - No data to display       Consult(s):   None    =  MDM:   Patient with a flareup of her asthma I did put her on some prednisone for that. She was given a few days of Lasix last month for swelling in her feet and ankles and she said she needs put back on that and she will see her doctor for further evaluation.   She said that the hydrochlorothiazide or the spironolactone that she is ordered routinely is not helping she will stop that I did give her some Lasix and advised her to see her doctor soon as possible, if  she develops shortness of breath or worsening symptoms she should go directly to the ED. Assessment      1. Leg edema    2. Uncomplicated asthma, unspecified asthma severity, unspecified whether persistent      Plan   Discharge to home and advised to contact Nighat Weiss, [address] [place] [id number] [phone]    Schedule an appointment as soon as possible for a visit      Patient condition is good    New Medications     Discharge Medication List as of 7/5/2021  7:24 PM      START taking these medications    Details   furosemide (LASIX) 40 MG tablet Take 1 tablet by mouth daily for 5 days, Disp-5 tablet, R-0Normal      predniSONE (DELTASONE) 20 MG tablet Take 2 tablets by mouth daily for 5 days, Disp-10 tablet, R-0Normal           Electronically signed by HERBERT Gonzalez CNP   DD: 7/5/21  **This report was transcribed using voice recognition software. Every effort was made to ensure accuracy; however, inadvertent computerized transcription errors may be present.   END OF ED PROVIDER NOTE     HERBERT Gonzalez CNP  07/05/21 0556

## 2021-07-16 ENCOUNTER — HOSPITAL ENCOUNTER (OUTPATIENT)
Dept: ULTRASOUND IMAGING | Age: 61
Discharge: HOME | End: 2021-07-16
Payer: COMMERCIAL

## 2021-07-16 DIAGNOSIS — R60.0 LEG EDEMA: ICD-10-CM

## 2021-07-16 PROCEDURE — 93971 EXTREMITY STUDY: CPT

## 2021-07-19 ENCOUNTER — HOSPITAL ENCOUNTER (OUTPATIENT)
Age: 61
Discharge: HOME | End: 2021-07-19
Payer: COMMERCIAL

## 2021-07-19 PROCEDURE — 36415 COLL VENOUS BLD VENIPUNCTURE: CPT

## 2021-07-19 PROCEDURE — 83880 ASSAY OF NATRIURETIC PEPTIDE: CPT

## 2021-07-19 PROCEDURE — 80053 COMPREHEN METABOLIC PANEL: CPT

## 2021-07-20 LAB
ALBUMIN SERPL-MCNC: 4 G/DL (ref 3.5–5.2)
ALP BLD-CCNC: 72 U/L (ref 35–104)
ALT SERPL-CCNC: 14 U/L (ref 0–32)
ANION GAP SERPL CALCULATED.3IONS-SCNC: 9 MMOL/L (ref 7–16)
AST SERPL-CCNC: 11 U/L (ref 0–31)
BILIRUB SERPL-MCNC: <0.2 MG/DL (ref 0–1.2)
BUN BLDV-MCNC: 21 MG/DL (ref 6–23)
CALCIUM SERPL-MCNC: 9.2 MG/DL (ref 8.6–10.2)
CHLORIDE BLD-SCNC: 101 MMOL/L (ref 98–107)
CO2: 28 MMOL/L (ref 22–29)
CREAT SERPL-MCNC: 1.2 MG/DL (ref 0.5–1)
GFR AFRICAN AMERICAN: 55
GFR NON-AFRICAN AMERICAN: 55 ML/MIN/1.73
GLUCOSE BLD-MCNC: 104 MG/DL (ref 74–99)
POTASSIUM SERPL-SCNC: 4.6 MMOL/L (ref 3.5–5)
PRO-BNP: 67 PG/ML (ref 0–125)
SODIUM BLD-SCNC: 138 MMOL/L (ref 132–146)
TOTAL PROTEIN: 7 G/DL (ref 6.4–8.3)

## 2021-08-16 ENCOUNTER — HOSPITAL ENCOUNTER (EMERGENCY)
Age: 61
Discharge: HOME | End: 2021-08-16
Payer: COMMERCIAL

## 2021-08-16 ENCOUNTER — APPOINTMENT (OUTPATIENT)
Dept: GENERAL RADIOLOGY | Age: 61
End: 2021-08-16
Payer: COMMERCIAL

## 2021-08-16 VITALS
DIASTOLIC BLOOD PRESSURE: 87 MMHG | BODY MASS INDEX: 40.41 KG/M2 | HEART RATE: 82 BPM | SYSTOLIC BLOOD PRESSURE: 152 MMHG | RESPIRATION RATE: 20 BRPM | WEIGHT: 258 LBS | OXYGEN SATURATION: 99 % | TEMPERATURE: 98 F

## 2021-08-16 DIAGNOSIS — B37.9 YEAST INFECTION: ICD-10-CM

## 2021-08-16 DIAGNOSIS — R11.0 NAUSEA: ICD-10-CM

## 2021-08-16 DIAGNOSIS — R10.9 ABDOMINAL PAIN, UNSPECIFIED ABDOMINAL LOCATION: Primary | ICD-10-CM

## 2021-08-16 LAB
BACTERIA: ABNORMAL /HPF
BASOPHILS ABSOLUTE: 0.03 E9/L (ref 0–0.2)
BASOPHILS RELATIVE PERCENT: 0.3 % (ref 0–2)
BILIRUBIN URINE: ABNORMAL
BLOOD, URINE: ABNORMAL
CLARITY: CLEAR
COLOR: YELLOW
EOSINOPHILS ABSOLUTE: 0.24 E9/L (ref 0.05–0.5)
EOSINOPHILS RELATIVE PERCENT: 2.7 % (ref 0–6)
EPITHELIAL CELLS, UA: ABNORMAL /HPF
GFR AFRICAN AMERICAN: >60
GFR NON-AFRICAN AMERICAN: >60 ML/MIN/1.73
GLUCOSE BLD-MCNC: 130 MG/DL (ref 74–99)
GLUCOSE URINE: NEGATIVE MG/DL
HCT VFR BLD CALC: 36.6 % (ref 34–48)
HEMOGLOBIN: 11.8 G/DL (ref 11.5–15.5)
IMMATURE GRANULOCYTES #: 0.05 E9/L
IMMATURE GRANULOCYTES %: 0.6 % (ref 0–5)
KETONES, URINE: 15 MG/DL
LEUKOCYTE ESTERASE, URINE: NEGATIVE
LYMPHOCYTES ABSOLUTE: 1.14 E9/L (ref 1.5–4)
LYMPHOCYTES RELATIVE PERCENT: 12.7 % (ref 20–42)
MCH RBC QN AUTO: 26.7 PG (ref 26–35)
MCHC RBC AUTO-ENTMCNC: 32.2 % (ref 32–34.5)
MCV RBC AUTO: 82.8 FL (ref 80–99.9)
MONOCYTES ABSOLUTE: 0.67 E9/L (ref 0.1–0.95)
MONOCYTES RELATIVE PERCENT: 7.5 % (ref 2–12)
NEUTROPHILS ABSOLUTE: 6.84 E9/L (ref 1.8–7.3)
NEUTROPHILS RELATIVE PERCENT: 76.2 % (ref 43–80)
NITRITE, URINE: NEGATIVE
PDW BLD-RTO: 13.3 FL (ref 11.5–15)
PERFORMED ON: ABNORMAL
PH UA: 7 (ref 5–9)
PLATELET # BLD: 384 E9/L (ref 130–450)
PMV BLD AUTO: 8.9 FL (ref 7–12)
POC CHLORIDE: 100 MMOL/L (ref 100–108)
POC CREATININE: 0.7 MG/DL (ref 0.5–1)
POC POTASSIUM: 4.3 MMOL/L (ref 3.5–5)
POC SODIUM: 135 MMOL/L (ref 132–146)
PROTEIN UA: 30 MG/DL
RBC # BLD: 4.42 E12/L (ref 3.5–5.5)
RBC UA: ABNORMAL /HPF (ref 0–2)
SPECIFIC GRAVITY UA: 1.02 (ref 1–1.03)
UROBILINOGEN, URINE: 1 E.U./DL
WBC # BLD: 9 E9/L (ref 4.5–11.5)
WBC UA: ABNORMAL /HPF (ref 0–5)
YEAST: PRESENT /HPF

## 2021-08-16 PROCEDURE — 85025 COMPLETE CBC W/AUTO DIFF WBC: CPT

## 2021-08-16 PROCEDURE — 36415 COLL VENOUS BLD VENIPUNCTURE: CPT

## 2021-08-16 PROCEDURE — 84132 ASSAY OF SERUM POTASSIUM: CPT

## 2021-08-16 PROCEDURE — 99211 OFF/OP EST MAY X REQ PHY/QHP: CPT

## 2021-08-16 PROCEDURE — 82947 ASSAY GLUCOSE BLOOD QUANT: CPT

## 2021-08-16 PROCEDURE — 84295 ASSAY OF SERUM SODIUM: CPT

## 2021-08-16 PROCEDURE — 74019 RADEX ABDOMEN 2 VIEWS: CPT

## 2021-08-16 PROCEDURE — 82435 ASSAY OF BLOOD CHLORIDE: CPT

## 2021-08-16 PROCEDURE — 82565 ASSAY OF CREATININE: CPT

## 2021-08-16 PROCEDURE — 96372 THER/PROPH/DIAG INJ SC/IM: CPT

## 2021-08-16 PROCEDURE — 6360000002 HC RX W HCPCS: Performed by: PHYSICIAN ASSISTANT

## 2021-08-16 PROCEDURE — 81001 URINALYSIS AUTO W/SCOPE: CPT

## 2021-08-16 RX ORDER — KETOROLAC TROMETHAMINE 30 MG/ML
30 INJECTION, SOLUTION INTRAMUSCULAR; INTRAVENOUS ONCE
Status: COMPLETED | OUTPATIENT
Start: 2021-08-16 | End: 2021-08-16

## 2021-08-16 RX ORDER — DICYCLOMINE HCL 20 MG
20 TABLET ORAL EVERY 6 HOURS PRN
Qty: 12 TABLET | Refills: 0 | Status: SHIPPED | OUTPATIENT
Start: 2021-08-16 | End: 2021-11-13 | Stop reason: ALTCHOICE

## 2021-08-16 RX ORDER — PROMETHAZINE HYDROCHLORIDE 25 MG/1
25 TABLET ORAL EVERY 8 HOURS PRN
Qty: 6 TABLET | Refills: 0 | Status: SHIPPED | OUTPATIENT
Start: 2021-08-16

## 2021-08-16 RX ORDER — FLUCONAZOLE 150 MG/1
150 TABLET ORAL ONCE
Qty: 1 TABLET | Refills: 0 | Status: SHIPPED | OUTPATIENT
Start: 2021-08-16 | End: 2021-08-16

## 2021-08-16 RX ADMIN — KETOROLAC TROMETHAMINE 30 MG: 30 INJECTION, SOLUTION INTRAMUSCULAR; INTRAVENOUS at 11:57

## 2021-08-16 ASSESSMENT — PAIN SCALES - GENERAL
PAINLEVEL_OUTOF10: 9
PAINLEVEL_OUTOF10: 9
PAINLEVEL_OUTOF10: 7

## 2021-08-16 ASSESSMENT — PAIN DESCRIPTION - DESCRIPTORS: DESCRIPTORS: BURNING

## 2021-08-16 ASSESSMENT — PAIN DESCRIPTION - LOCATION: LOCATION: ABDOMEN

## 2021-08-16 NOTE — ED PROVIDER NOTES
[address] [facility]  Department of Emergency Medicine  UC Encounter Note  21   10:37 AM EDT      NAME: Fuad Giron  :  1960  MRN:  [id number]    Chief Complaint: Abdominal Pain (Pt c/o abdominal pain, that  started yesterday after she forced herself to vomit, she states she ia bulimic)      This is a 57year old female that presents to urgent care with a complaint of severe abdominal pain and nausea  for the past several days. Says she has tried [employer] and Zofran. Denies chest pain or shortness of breath. Denies difficulty urinating. On first contact with patient, the patient is in no acute distress. Review of Systems  Pertinent positives and negatives are stated within HPI, all other systems reviewed and are negative. Physical Exam  Vitals and nursing note reviewed. Constitutional:       Appearance: She is well-developed. HENT:      Head: Normocephalic and atraumatic. Right Ear: Hearing and external ear normal.      Left Ear: Hearing and external ear normal.      Nose: Nose normal.      Mouth/Throat:      Pharynx: Uvula midline. Eyes:      General: Lids are normal.      Conjunctiva/sclera: Conjunctivae normal.      Pupils: Pupils are equal, round, and reactive to light. Cardiovascular:      Rate and Rhythm: Normal rate and regular rhythm. Heart sounds: Normal heart sounds. No murmur heard. Pulmonary:      Effort: Pulmonary effort is normal.      Breath sounds: Normal breath sounds. Abdominal:      General: Bowel sounds are normal.      Palpations: Abdomen is soft. Abdomen is not rigid. Tenderness: There is generalized abdominal tenderness. There is no guarding or rebound. Musculoskeletal:      Cervical back: Normal range of motion and neck supple. Skin:     General: Skin is warm and dry. Findings: No abrasion or rash. Neurological:      Mental Status: She is alert and oriented to person, place, and time.       GCS: GCS eye subscore is 4. GCS verbal subscore is 5. GCS motor subscore is 6. Cranial Nerves: No cranial nerve deficit. Sensory: No sensory deficit. Coordination: Coordination normal.      Gait: Gait normal.         Procedures    MDM  Number of Diagnoses or Management Options  Abdominal pain, unspecified abdominal location  Nausea  Yeast infection  Diagnosis management comments: Labs reviewed  No acute distress  Abdomen is benign. Placed on Bentyl. Take Phenergan because she states that Zofran is not helping as much. Add Diflucan for yeast infection.           --------------------------------------------- PAST HISTORY ---------------------------------------------  Past Medical History:  has a past medical history of Arthritis, Asthma, [facility] Depressed, Diabetes mellitus ([facility] Fibromyalgia, Hepatitis, HIV (human immunodeficiency virus infection) ([facility] Hyperlipidemia, Hypertension, MS (multiple sclerosis) ([facility] Respiratory disease, Sleep apnea, and Sleep apnea. Past Surgical History:  has a past surgical history that includes sinus surgery; Hysterectomy; Tonsillectomy; Colonoscopy; and Tonsillectomy. Social History:  reports that she has never smoked. She has never used smokeless tobacco. She reports that she does not drink alcohol and does not use drugs. Family History: family history includes Cancer in her sister; High Cholesterol in her father and mother; Hypertension in her mother; Osteoarthritis in her father, mother, and sister. The patients home medications have been reviewed.     Allergies: Amoxicillin, Augmentin [amoxicillin-pot clavulanate], Clavulanic acid, and Chlorphen-pe-acetaminophen    -------------------------------------------------- RESULTS -------------------------------------------------  Results for orders placed or performed during the hospital encounter of 08/16/21   CBC Auto Differential   Result Value Ref Range    WBC 9.0 4.5 - 11.5 E9/L    RBC 4.42 3.50 - 5.50 E12/L    Hemoglobin 11.8 11.5 - 15.5 g/dL    Hematocrit 36.6 34.0 - 48.0 %    MCV 82.8 80.0 - 99.9 fL    MCH 26.7 26.0 - 35.0 pg    MCHC 32.2 32.0 - 34.5 %    RDW 13.3 11.5 - 15.0 fL    Platelets 957 549 - 800 E9/L    MPV 8.9 7.0 - 12.0 fL    Neutrophils % 76.2 43.0 - 80.0 %    Immature Granulocytes % 0.6 0.0 - 5.0 %    Lymphocytes % 12.7 (L) 20.0 - 42.0 %    Monocytes % 7.5 2.0 - 12.0 %    Eosinophils % 2.7 0.0 - 6.0 %    Basophils % 0.3 0.0 - 2.0 %    Neutrophils Absolute 6.84 1.80 - 7.30 E9/L    Immature Granulocytes # 0.05 E9/L    Lymphocytes Absolute 1.14 (L) 1.50 - 4.00 E9/L    Monocytes Absolute 0.67 0.10 - 0.95 E9/L    Eosinophils Absolute 0.24 0.05 - 0.50 E9/L    Basophils Absolute 0.03 0.00 - 0.20 E9/L   Urinalysis   Result Value Ref Range    Color, UA Yellow Straw/Yellow    Clarity, UA Clear Clear    Glucose, Ur Negative Negative mg/dL    Bilirubin Urine SMALL (A) Negative    Ketones, Urine 15 (A) Negative mg/dL    Specific Gravity, UA 1.025 1.005 - 1.030    Blood, Urine TRACE (A) Negative    pH, UA 7.0 5.0 - 9.0    Protein, UA 30 (A) Negative mg/dL    Urobilinogen, Urine 1.0 <2.0 E.U./dL    Nitrite, Urine Negative Negative    Leukocyte Esterase, Urine Negative Negative   Microscopic Urinalysis   Result Value Ref Range    WBC, UA 2-5 0 - 5 /HPF    RBC, UA 1-3 0 - 2 /HPF    Epithelial Cells, UA FEW /HPF    Bacteria, UA FEW (A) None Seen /HPF    Yeast, UA Present (A) None Seen /HPF   POCT Venous   Result Value Ref Range    POC Sodium 135 132 - 146 mmol/L    POC Potassium 4.3 3.5 - 5.0 mmol/L    POC Chloride 100 100 - 108 mmol/L    POC Glucose 130 (H) 74 - 99 mg/dl    POC Creatinine 0.7 0.5 - 1.0 mg/dL    GFR Non-African American >60 >=60 mL/min/1.73    GFR  >60     Performed on SEE BELOW      XR ABDOMEN (2 VIEWS)   Final Result   No evidence of bowel obstruction.       No acute radiographic findings in the abdomen ------------------------- NURSING NOTES AND VITALS REVIEWED ---------------------------   The nursing notes within the ED encounter and vital signs as below have been reviewed. BP (!) 152/87   Pulse 82   Temp 98 °F (36.7 °C) (Infrared)   Resp 20   Wt 258 lb (117 kg)   SpO2 99%   BMI 40.41 kg/m²   Oxygen Saturation Interpretation: Normal      ------------------------------------------ PROGRESS NOTES ------------------------------------------   I have spoken with the patient and discussed todays results, in addition to providing specific details for the plan of care and counseling regarding the diagnosis and prognosis. Their questions are answered at this time and they are agreeable with the plan.      --------------------------------- ADDITIONAL PROVIDER NOTES ---------------------------------     This patient is stable for discharge. I have shared the specific conditions for return, as well as the importance of follow-up. * NOTE: This report was transcribed using voice recognition software. Every effort was made to ensure accuracy; however, inadvertent computerized transcription errors may be present.    --------------------------------- IMPRESSION AND DISPOSITION ---------------------------------    IMPRESSION  1. Abdominal pain, unspecified abdominal location    2. Nausea    3.  Yeast infection        DISPOSITION  Disposition: Discharge to home  Patient condition is good        Jah Thompson PA-C  08/16/21 0008

## 2021-08-19 ENCOUNTER — HOSPITAL ENCOUNTER (OUTPATIENT)
Dept: NON INVASIVE DIAGNOSTICS | Age: 61
Discharge: HOME | End: 2021-08-19
Payer: COMMERCIAL

## 2021-08-19 ENCOUNTER — HOSPITAL ENCOUNTER (OUTPATIENT)
Dept: NUCLEAR MEDICINE | Age: 61
Discharge: HOME | End: 2021-08-19
Payer: COMMERCIAL

## 2021-08-19 DIAGNOSIS — R07.9 CHEST PAIN, UNSPECIFIED TYPE: ICD-10-CM

## 2021-08-19 LAB
LV EF: 63 %
LVEF MODALITY: NORMAL

## 2021-08-19 PROCEDURE — A9500 TC99M SESTAMIBI: HCPCS | Performed by: RADIOLOGY

## 2021-08-19 PROCEDURE — 78451 HT MUSCLE IMAGE SPECT SING: CPT

## 2021-08-19 PROCEDURE — 6360000002 HC RX W HCPCS: Performed by: NURSE PRACTITIONER

## 2021-08-19 PROCEDURE — 93306 TTE W/DOPPLER COMPLETE: CPT

## 2021-08-19 PROCEDURE — 93017 CV STRESS TEST TRACING ONLY: CPT

## 2021-08-19 PROCEDURE — 78451 HT MUSCLE IMAGE SPECT SING: CPT | Performed by: INTERNAL MEDICINE

## 2021-08-19 PROCEDURE — 93306 TTE W/DOPPLER COMPLETE: CPT | Performed by: INTERNAL MEDICINE

## 2021-08-19 PROCEDURE — 3430000000 HC RX DIAGNOSTIC RADIOPHARMACEUTICAL: Performed by: RADIOLOGY

## 2021-08-19 RX ADMIN — Medication 30 MILLICURIE: at 10:01

## 2021-08-19 RX ADMIN — REGADENOSON 0.4 MG: 0.08 INJECTION, SOLUTION INTRAVENOUS at 11:13

## 2021-08-19 RX ADMIN — Medication 10 MILLICURIE: at 08:26

## 2021-08-19 NOTE — PROGRESS NOTES
Pt. IV infiltrated during stress portion, therefore unable to complete stress today. She will return tomorrow [email or web address] to complete study. .. Kristen Cobb RN

## 2021-08-19 NOTE — PROCEDURES
[facility] Nuclear Stress Test:    Cardiologist: Dr. Lizabeth Murray MD    Date: 8/19/2021    Indications for study: Chest pain    1. No chest pain  2. No new arrhythmias  3. No EKG changes suggestive of stress induced ischemia  4.  Nuclear images pending    Lizabeth Murray MD  [facility]) Cardiology

## 2021-08-27 ENCOUNTER — APPOINTMENT (OUTPATIENT)
Dept: GENERAL RADIOLOGY | Age: 61
End: 2021-08-27
Payer: COMMERCIAL

## 2021-08-27 ENCOUNTER — HOSPITAL ENCOUNTER (EMERGENCY)
Age: 61
Discharge: HOME | End: 2021-08-27
Payer: COMMERCIAL

## 2021-08-27 VITALS
TEMPERATURE: 97.3 F | HEART RATE: 98 BPM | BODY MASS INDEX: 39.78 KG/M2 | OXYGEN SATURATION: 96 % | RESPIRATION RATE: 18 BRPM | SYSTOLIC BLOOD PRESSURE: 130 MMHG | WEIGHT: 254 LBS | DIASTOLIC BLOOD PRESSURE: 68 MMHG

## 2021-08-27 DIAGNOSIS — J45.909 UNCOMPLICATED ASTHMA, UNSPECIFIED ASTHMA SEVERITY, UNSPECIFIED WHETHER PERSISTENT: Primary | ICD-10-CM

## 2021-08-27 PROCEDURE — 71046 X-RAY EXAM CHEST 2 VIEWS: CPT

## 2021-08-27 PROCEDURE — 6360000002 HC RX W HCPCS: Performed by: NURSE PRACTITIONER

## 2021-08-27 PROCEDURE — 96372 THER/PROPH/DIAG INJ SC/IM: CPT

## 2021-08-27 PROCEDURE — 99211 OFF/OP EST MAY X REQ PHY/QHP: CPT

## 2021-08-27 RX ORDER — ALBUTEROL SULFATE 90 UG/1
2 AEROSOL, METERED RESPIRATORY (INHALATION) EVERY 6 HOURS PRN
Qty: 1 INHALER | Refills: 0 | Status: SHIPPED | OUTPATIENT
Start: 2021-08-27 | End: 2022-01-31 | Stop reason: SDUPTHER

## 2021-08-27 RX ORDER — METHYLPREDNISOLONE SODIUM SUCCINATE 125 MG/2ML
125 INJECTION, POWDER, LYOPHILIZED, FOR SOLUTION INTRAMUSCULAR; INTRAVENOUS ONCE
Status: COMPLETED | OUTPATIENT
Start: 2021-08-27 | End: 2021-08-27

## 2021-08-27 RX ORDER — AZITHROMYCIN 250 MG/1
TABLET, FILM COATED ORAL
Qty: 6 TABLET | Refills: 0 | Status: SHIPPED | OUTPATIENT
Start: 2021-08-27 | End: 2021-09-06

## 2021-08-27 RX ORDER — PREDNISONE 20 MG/1
40 TABLET ORAL DAILY
Qty: 10 TABLET | Refills: 0 | Status: SHIPPED | OUTPATIENT
Start: 2021-08-27 | End: 2021-09-01

## 2021-08-27 RX ORDER — GUAIFENESIN/DEXTROMETHORPHAN 100-10MG/5
10 SYRUP ORAL 3 TIMES DAILY PRN
Qty: 120 ML | Refills: 0 | Status: SHIPPED | OUTPATIENT
Start: 2021-08-27 | End: 2021-09-06

## 2021-08-27 RX ADMIN — METHYLPREDNISOLONE SODIUM SUCCINATE 125 MG: 125 INJECTION, POWDER, FOR SOLUTION INTRAMUSCULAR; INTRAVENOUS at 15:43

## 2021-08-27 NOTE — ED PROVIDER NOTES
HPI:  8/27/21, Time: 7:03 PM EDT         Shaila Kurtz is a 57 y.o. female presenting to the ED for flareup of her asthma and COPD. She said that she lives in an apartment that does not have any air conditioning and she said is extremely hot. She is just using fans  and she has been having trouble breathing because of it. She said it has  flared up her asthma. She said she does not have a fever does not have chest pain but does have increased wheezing and shortness of breath. She said that she has been fully vaccinated for Covid. She is denying any nasal congestion, body aches fevers      Review of Systems:   A complete review of systems was performed and pertinent positives and negatives are stated within HPI, all other systems reviewed and are negative.          --------------------------------------------- PAST HISTORY ---------------------------------------------  Past Medical History:  has a past medical history of Arthritis, Asthma, COPD (chronic obstructive pulmonary disease) ([facility] Depressed, Diabetes mellitus ([facility] Fibromyalgia, H/O cardiovascular stress test, Hepatitis, [facility] immunodeficiency virus [facility]) [facility] Hyperlipidemia, Hypertension, MS (multiple sclerosis) ([facility] Respiratory disease, Sleep apnea, and Sleep apnea. Past Surgical History:  has a past surgical history that includes sinus surgery; Hysterectomy; Tonsillectomy; Colonoscopy; and Tonsillectomy. Social History:  reports that she has never smoked. She has never used smokeless tobacco. She reports that she does not drink alcohol and does not use drugs. Family History: family history includes Cancer in her sister; High Cholesterol in her father and mother; Hypertension in her mother; Osteoarthritis in her father, mother, and sister. The patients home medications have been reviewed.     Allergies: Amoxicillin, Augmentin [amoxicillin-pot clavulanate], Clavulanic acid, and Chlorphen-pe-acetaminophen    -------------------------------------------------- RESULTS -------------------------------------------------  All laboratory and radiology results have been personally reviewed by myself   LABS:  No results found for this visit on 08/27/21. RADIOLOGY:  Interpreted by Radiologist.  XR CHEST (2 VW)   Final Result   No acute process. ------------------------- NURSING NOTES AND VITALS REVIEWED ---------------------------   The nursing notes within the ED encounter and vital signs as below have been reviewed. /68   Pulse 98   Temp 97.3 °F (36.3 °C) (Infrared)   Resp 18   Wt 254 lb (115.2 kg)   SpO2 96%   BMI 39.78 kg/m²   Oxygen Saturation Interpretation: Normal      ---------------------------------------------------PHYSICAL EXAM--------------------------------------      Constitutional/General: Alert and oriented x3, well appearing, non toxic in NAD  Head: Normocephalic and atraumatic  Eyes: clear  Mouth: Oropharynx clear, handling secretions, no trismus  Neck: Supple, full ROM,   Pulmonary: diminished with wheezing throughout. Cardiovascular:  Regular rate and rhythm,  Abdomen: Soft, non tender, non distended,   Extremities: Moves all extremities x 4. Warm and well perfused  Skin: warm and dry without rash  Neurologic: GCS 15,  Psych: Normal Affect      ------------------------------ ED COURSE/MEDICAL DECISION MAKING----------------------  Medications   methylPREDNISolone sodium (SOLU-MEDROL) injection 125 mg (125 mg IntraMUSCular Given 8/27/21 5876)         ED COURSE:       Medical Decision Making:    Does have wheezing throughout and diminished breath sounds her sats 96% the temps normal at 97.3 she was given Solu-Medrol IM, will also do a chest x-ray. Chest x-ray was negative. She believes that this is just a flareup of her asthma and COPD. She has been vaccinated for Covid. Has not been exposed to Covid. I did refill her albuterol inhaler.   I did put her on a Z-Nate, I also put her on prednisone to start tomorrow she has chest pain worsening shortness of breath or any worsening symptoms she needs to return or go to the ED.          --------------------------------- IMPRESSION AND DISPOSITION ---------------------------------    IMPRESSION  1. Uncomplicated asthma, unspecified asthma severity, unspecified whether persistent        DISPOSITION  Disposition: Discharge to home  Patient condition is good      NOTE: This report was transcribed using voice recognition software.  Every effort was made to ensure accuracy; however, inadvertent computerized transcription errors may be present     HERBERT Bernardo CNP  08/27/21 8817

## 2021-10-05 ENCOUNTER — HOSPITAL ENCOUNTER (OUTPATIENT)
Age: 61
Discharge: HOME | End: 2021-10-07
Payer: COMMERCIAL

## 2021-10-05 ENCOUNTER — HOSPITAL ENCOUNTER (OUTPATIENT)
Dept: GENERAL RADIOLOGY | Age: 61
Discharge: HOME | End: 2021-10-07
Payer: COMMERCIAL

## 2021-10-05 DIAGNOSIS — R06.02 SOB (SHORTNESS OF BREATH): ICD-10-CM

## 2021-10-05 PROCEDURE — 71046 X-RAY EXAM CHEST 2 VIEWS: CPT

## 2021-10-08 ENCOUNTER — HOSPITAL ENCOUNTER (EMERGENCY)
Age: 61
Discharge: HOME | End: 2021-10-08
Payer: COMMERCIAL

## 2021-10-08 VITALS
HEART RATE: 80 BPM | OXYGEN SATURATION: 98 % | DIASTOLIC BLOOD PRESSURE: 94 MMHG | TEMPERATURE: 98 F | SYSTOLIC BLOOD PRESSURE: 125 MMHG | RESPIRATION RATE: 20 BRPM

## 2021-10-08 DIAGNOSIS — J45.909 UNCOMPLICATED ASTHMA, UNSPECIFIED ASTHMA SEVERITY, UNSPECIFIED WHETHER PERSISTENT: Primary | ICD-10-CM

## 2021-10-08 PROCEDURE — 6360000002 HC RX W HCPCS: Performed by: NURSE PRACTITIONER

## 2021-10-08 PROCEDURE — 96372 THER/PROPH/DIAG INJ SC/IM: CPT

## 2021-10-08 PROCEDURE — 99211 OFF/OP EST MAY X REQ PHY/QHP: CPT

## 2021-10-08 RX ORDER — PREDNISONE 20 MG/1
40 TABLET ORAL DAILY
Qty: 10 TABLET | Refills: 0 | Status: SHIPPED | OUTPATIENT
Start: 2021-10-08 | End: 2021-10-13

## 2021-10-08 RX ORDER — DEXAMETHASONE SODIUM PHOSPHATE 10 MG/ML
10 INJECTION, SOLUTION INTRAMUSCULAR; INTRAVENOUS ONCE
Status: DISCONTINUED | OUTPATIENT
Start: 2021-10-08 | End: 2021-10-08

## 2021-10-08 RX ORDER — METHYLPREDNISOLONE SODIUM SUCCINATE 125 MG/2ML
125 INJECTION, POWDER, LYOPHILIZED, FOR SOLUTION INTRAMUSCULAR; INTRAVENOUS ONCE
Status: COMPLETED | OUTPATIENT
Start: 2021-10-08 | End: 2021-10-08

## 2021-10-08 RX ORDER — AZITHROMYCIN 250 MG/1
TABLET, FILM COATED ORAL
Qty: 6 TABLET | Refills: 0 | Status: SHIPPED | OUTPATIENT
Start: 2021-10-08 | End: 2021-10-18

## 2021-10-08 RX ADMIN — METHYLPREDNISOLONE SODIUM SUCCINATE 125 MG: 125 INJECTION, POWDER, FOR SOLUTION INTRAMUSCULAR; INTRAVENOUS at 12:39

## 2021-10-08 NOTE — ED PROVIDER NOTES
Department of Emergency [address] [facility]  Provider Note  Admit Date/RoomTime: 10/8/2021 12:06 PM  Room:   NAME: Rebecca Briones  : 1960  MRN: [id number]     Chief Complaint:  Cough (started  3 days ago    was just in here) and Shortness of Breath    History of Present Illness       Rebecca Briones is a 57 y.o. old female who presents to the emergency department for a flairup of asthma. She said she just saw her doctor for this 3 days ago and was ordered a chest x-ray and it was negative. She said she has albuterol nebulizer ordered but has not got it yet. She said that she does use inhalers. She states she is wheezing and more short of breath does not have a fever not complaining of loss of taste or smell nasal congestion headache or body aches. States that she has been vaccinated for Covid. Has not been exposed to Covid. ROS    Pertinent positives and negatives are stated within HPI, all other systems reviewed and are negative. Past Surgical History:   Procedure Laterality Date    COLONOSCOPY      HYSTERECTOMY      SINUS SURGERY      TONSILLECTOMY      TONSILLECTOMY     Social History:  reports that she has never smoked. She has never used smokeless tobacco. She reports that she does not drink alcohol and does not use drugs. Family History: family history includes Cancer in her sister; High Cholesterol in her father and mother; Hypertension in her mother; Osteoarthritis in her father, mother, and sister. Allergies: Amoxicillin, Augmentin [amoxicillin-pot clavulanate], Clavulanic acid, and Chlorphen-pe-acetaminophen    Physical Exam            ED Triage Vitals [10/08/21 1207]   BP Temp Temp Source Pulse Resp SpO2 Height Weight   (!) 125/94 98 °F (36.7 °C) Infrared 80 20 98 % -- --      Oxygen Saturation Interpretation: Normal.    Constitutional:  Alert, development consistent with age.   Ears:  External Ears: Bilateral normal. Nose:   There is no discharge, swelling or lesions noted. Sinuses: no Bilateral maxillary sinus tenderness. no Bilateral frontal sinus tenderness. Neck/Lymphatics:  Neck Supple. Respiratory:    Lung sounds: diminished and wheezing- throughout. CV:  Regular rate and rhythm, normal heart sounds, without pathological murmurs, ectopy, gallops, or rubs. GI:  Abdomen Soft, nontender, good bowel sounds. No firm or pulsatile mass. Integument:  Normal turgor. Warm, dry, without visible rash. Neurological:  Oriented. Motor functions intact. Lab / Imaging Results   (All laboratory and radiology results have been personally reviewed by myself)  Labs:  No results found for this visit on 10/08/21. Imaging: All Radiology results interpreted by Radiologist unless otherwise noted. No orders to display     ED Course / Medical Decision Making     Medications   methylPREDNISolone sodium (SOLU-MEDROL) injection 125 mg (125 mg IntraMUSCular Given 10/8/21 1239)          MDM:   SHe just had an x-ray 3 days ago for this by her PCP-- it was normal--  he ordered her a  nebulizer she has not gotten it yet but she is having more wheezing. She has asthma she said this is the way she always gets. She says that usually a shot of Solu-Medrol helps her along with some prednisone. Her sat is  98% she is in no respiratory distress. Is not complaining of chest pain. She does not  have any other covid symptoms. Has  not has not been exposed. She does not feel she needs a Covid test she feels this is just a flareup of her asthma. I did give her Solu-Medrol IM I did start her on prednisone starting tomorrow she should get the nebulizer that was ordered and use that and follow-up with her doctor. 1. Uncomplicated asthma, unspecified asthma severity, unspecified whether persistent      Plan   Discharge to home and advised to contact No follow-up provider specified.  Patient condition is good    New Medications     Discharge Medication List as of 10/8/2021 12:33 PM      START taking these medications    Details   azithromycin (ZITHROMAX Z-MARIO) 250 MG tablet Take 2 tabs on day one, followed by 1 tablet daily for 4 days. , Disp-6 tablet, R-0Normal      predniSONE (DELTASONE) 20 MG tablet Take 2 tablets by mouth daily for 5 days Start on 10/9, Disp-10 tablet, R-0Normal           Electronically signed by HERBERT Caballero CNP   DD: 10/8/21  **This report was transcribed using voice recognition software. Every effort was made to ensure accuracy; however, inadvertent computerized transcription errors may be present.   END OF ED PROVIDER NOTE     HERBERT Caballero CNP  10/08/21 9299

## 2021-11-01 ENCOUNTER — HOSPITAL ENCOUNTER (EMERGENCY)
Age: 61
Discharge: HOME | End: 2021-11-01
Payer: COMMERCIAL

## 2021-11-01 VITALS
SYSTOLIC BLOOD PRESSURE: 119 MMHG | DIASTOLIC BLOOD PRESSURE: 64 MMHG | BODY MASS INDEX: 42.27 KG/M2 | HEIGHT: 66 IN | HEART RATE: 88 BPM | TEMPERATURE: 97.6 F | OXYGEN SATURATION: 98 % | RESPIRATION RATE: 18 BRPM | WEIGHT: 263 LBS

## 2021-11-01 DIAGNOSIS — J45.909 UNCOMPLICATED ASTHMA, UNSPECIFIED ASTHMA SEVERITY, UNSPECIFIED WHETHER PERSISTENT: Primary | ICD-10-CM

## 2021-11-01 PROCEDURE — 99211 OFF/OP EST MAY X REQ PHY/QHP: CPT

## 2021-11-01 PROCEDURE — 96372 THER/PROPH/DIAG INJ SC/IM: CPT

## 2021-11-01 PROCEDURE — 6360000002 HC RX W HCPCS: Performed by: NURSE PRACTITIONER

## 2021-11-01 RX ORDER — AZITHROMYCIN 250 MG/1
TABLET, FILM COATED ORAL
Qty: 6 TABLET | Refills: 0 | Status: SHIPPED | OUTPATIENT
Start: 2021-11-01 | End: 2021-11-11

## 2021-11-01 RX ORDER — PREDNISONE 20 MG/1
40 TABLET ORAL DAILY
Qty: 10 TABLET | Refills: 0 | Status: SHIPPED | OUTPATIENT
Start: 2021-11-01 | End: 2021-11-06

## 2021-11-01 RX ORDER — METHYLPREDNISOLONE SODIUM SUCCINATE 125 MG/2ML
125 INJECTION, POWDER, LYOPHILIZED, FOR SOLUTION INTRAMUSCULAR; INTRAVENOUS ONCE
Status: COMPLETED | OUTPATIENT
Start: 2021-11-01 | End: 2021-11-01

## 2021-11-01 RX ADMIN — METHYLPREDNISOLONE SODIUM SUCCINATE 125 MG: 125 INJECTION, POWDER, FOR SOLUTION INTRAMUSCULAR; INTRAVENOUS at 10:23

## 2021-11-01 ASSESSMENT — PAIN DESCRIPTION - ONSET: ONSET: ON-GOING

## 2021-11-01 ASSESSMENT — PAIN SCALES - GENERAL: PAINLEVEL_OUTOF10: 9

## 2021-11-01 ASSESSMENT — PAIN DESCRIPTION - LOCATION: LOCATION: GENERALIZED

## 2021-11-01 ASSESSMENT — PAIN DESCRIPTION - PAIN TYPE: TYPE: ACUTE PAIN

## 2021-11-01 ASSESSMENT — PAIN - FUNCTIONAL ASSESSMENT: PAIN_FUNCTIONAL_ASSESSMENT: PREVENTS OR INTERFERES SOME ACTIVE ACTIVITIES AND ADLS

## 2021-11-01 ASSESSMENT — PAIN DESCRIPTION - DESCRIPTORS: DESCRIPTORS: ACHING

## 2021-11-01 ASSESSMENT — PAIN DESCRIPTION - FREQUENCY: FREQUENCY: CONTINUOUS

## 2021-11-01 ASSESSMENT — PAIN DESCRIPTION - PROGRESSION: CLINICAL_PROGRESSION: GRADUALLY WORSENING

## 2021-11-01 NOTE — ED PROVIDER NOTES
non toxic in NAD  · HEENT:  NC/NT. · Neck: Supple, full ROM,   · Respiratory: diminished with wheezing throughout  · CV:  Regular rate. Regular rhythm. · Musculoskeletal: Moves all extremities x 4.   · Integument: skin warm and dry. No rashes. · Lymphatic: no lymphadenopathy noted  · Neurologic: GCS 15, no focal deficits,   · Psychiatric: Normal Affect    Lab / Imaging Results   (All laboratory and radiology results have been personally reviewed by myself)  Labs:  No results found for this visit on 11/01/21. Imaging: All Radiology results interpreted by Radiologist unless otherwise noted. No orders to display       ED Course / Medical Decision Making     Medications   methylPREDNISolone sodium (SOLU-MEDROL) injection 125 mg (has no administration in time range)          Consult(s):   None    MDM:   There was a flareup of her asthma. She usually gets a shot of the steroid and Z-Mario and prednisone. She said she needs the same medication she just has a flareup of her asthma she has inhalers and breathing treatments that she does at home. I did give her Solu-Medrol IM. I did start her on a Z-Mario and also prednisone daily for 5 days if she has any worsening symptoms or no improvement she should get reevaluated        Assessment      1. Uncomplicated asthma, unspecified asthma severity, unspecified whether persistent      Plan   Discharge to home and advised to contact Kirsten Parker, [address] [place] [address]  [phone]    Schedule an appointment as soon as possible for a visit      Patient condition is good    New Medications     New Prescriptions    AZITHROMYCIN (ZITHROMAX Z-MARIO) 250 MG TABLET    Take 2 tabs on day one, followed by 1 tablet daily for 4 days. PREDNISONE (DELTASONE) 20 MG TABLET    Take 2 tablets by mouth daily for 5 days     Electronically signed by HERBERT Caldwell CNP   DD: 11/1/21  **This report was transcribed using voice recognition software.  Every effort was made to ensure accuracy; however, inadvertent computerized transcription errors may be present.   END OF ED PROVIDER NOTE     HERBERT Rodriguez - CNP  11/01/21 1024

## 2021-11-13 ENCOUNTER — HOSPITAL ENCOUNTER (EMERGENCY)
Age: 61
Discharge: HOME | End: 2021-11-13
Payer: COMMERCIAL

## 2021-11-13 ENCOUNTER — APPOINTMENT (OUTPATIENT)
Dept: GENERAL RADIOLOGY | Age: 61
End: 2021-11-13
Payer: COMMERCIAL

## 2021-11-13 VITALS
RESPIRATION RATE: 20 BRPM | HEART RATE: 82 BPM | TEMPERATURE: 98.2 F | BODY MASS INDEX: 40.34 KG/M2 | OXYGEN SATURATION: 98 % | DIASTOLIC BLOOD PRESSURE: 66 MMHG | SYSTOLIC BLOOD PRESSURE: 114 MMHG | HEIGHT: 67 IN | WEIGHT: 257 LBS

## 2021-11-13 DIAGNOSIS — J06.9 VIRAL URI WITH COUGH: Primary | ICD-10-CM

## 2021-11-13 PROCEDURE — 71046 X-RAY EXAM CHEST 2 VIEWS: CPT

## 2021-11-13 PROCEDURE — 99211 OFF/OP EST MAY X REQ PHY/QHP: CPT

## 2021-11-13 RX ORDER — GUAIFENESIN, PSEUDOEPHEDRINE HYDROCHLORIDE 600; 60 MG/1; MG/1
1 TABLET, EXTENDED RELEASE ORAL EVERY 12 HOURS
Qty: 10 TABLET | Refills: 0 | Status: SHIPPED | OUTPATIENT
Start: 2021-11-13 | End: 2021-11-18

## 2021-11-13 RX ORDER — PREDNISONE 10 MG/1
40 TABLET ORAL DAILY
Qty: 20 TABLET | Refills: 0 | Status: SHIPPED | OUTPATIENT
Start: 2021-11-13 | End: 2021-11-18

## 2021-11-13 ASSESSMENT — PAIN SCALES - GENERAL: PAINLEVEL_OUTOF10: 8

## 2021-11-13 ASSESSMENT — PAIN DESCRIPTION - PROGRESSION: CLINICAL_PROGRESSION: GRADUALLY WORSENING

## 2021-11-13 ASSESSMENT — PAIN DESCRIPTION - ONSET: ONSET: GRADUAL

## 2021-11-13 ASSESSMENT — PAIN DESCRIPTION - PAIN TYPE: TYPE: ACUTE PAIN

## 2021-11-13 ASSESSMENT — PAIN DESCRIPTION - DESCRIPTORS: DESCRIPTORS: ACHING

## 2021-11-13 ASSESSMENT — PAIN DESCRIPTION - FREQUENCY: FREQUENCY: CONTINUOUS

## 2021-11-13 ASSESSMENT — PAIN DESCRIPTION - LOCATION: LOCATION: GENERALIZED

## 2021-11-13 NOTE — ED PROVIDER NOTES
[facility] [facility]  Department of Emergency Medicine   ED  Encounter Note  Admit Date/RoomTime: 2021  3:05 PM  ED Room:     NAME: Zia Todd  : 1960  MRN: [id number]     Chief Complaint:  Generalized Body Aches, Wheezing, and Shortness of Breath    History of Present Illness       Zia Todd is a 57 y.o. old female who presents to the emergency department with a complaint of cough and chest congestion for the past 3 days. Patient states she has had general body aches, sinus congestion, sinus drainage. She has been coughing. Clear phlegm. Wheezing. Feeling short of breath with exertion. Patient states that she has asthma and chronic bronchitis. Believes she is having a flareup. Wants another shot of steroids. In addition patient states that she was HIV positive and had AIDS. She is not on any medication for these conditions. She states that [employer] healed her. \"  Patient states she has had the coronavirus twice, does not believe she has it at this time. Symptoms are moderate in severity. States she has not been using her inhaler as she does not feel it was helping. In addition she has moved and cannot find her nebulizer machine. ROS   Pertinent positives and negatives are stated within HPI, all other systems reviewed and are negative. Past Medical History:  has a past medical history of Arthritis, Asthma, COPD (chronic obstructive pulmonary disease) ([address] Depressed, Diabetes mellitus ([address] Fibromyalgia, H/O cardiovascular stress test, Hepatitis, HIV (human immunodeficiency virus infection) ([facility] Hyperlipidemia, Hypertension, MS (multiple sclerosis) ([facility] Respiratory disease, Sleep apnea, and Sleep apnea. Surgical History:  has a past surgical history that includes sinus surgery; Hysterectomy; Tonsillectomy; Colonoscopy; and Tonsillectomy. Social History:  reports that she has never smoked.  She has never used smokeless tobacco. She reports that she does not drink alcohol and does not use drugs. Family History: family history includes Cancer in her sister; High Cholesterol in her father and mother; Hypertension in her mother; Osteoarthritis in her father, mother, and sister. Allergies: Amoxicillin, Augmentin [amoxicillin-pot clavulanate], Clavulanic acid, and Chlorphen-pe-acetaminophen    Physical Exam   Oxygen Saturation Interpretation: Normal on room air analysis. ED Triage Vitals [11/13/21 1506]   BP Temp Temp Source Pulse Resp SpO2 Height Weight   114/66 98.2 °F (36.8 °C) Infrared 82 20 98 % 5' 7\" (1.702 m) 257 lb (116.6 kg)         General:  NAD. Alert and Oriented. Well-appearing. Skin:  Warm, dry. No rashes. Head:  Normocephalic. Atraumatic. Eyes:  EOMI. Conjunctiva normal.  ENT:  Oral mucosa moist.  Airway patent. Posterior pharynx pink without erythema, without swelling, without exudate. Uvula midline with equal rise and without edema. Bilateral ear canals patent with minimal cerumen. Bilateral TM's without erythema, without bulging. Nasal turbinates with minimal swelling. Frontal and maxillary sinuses nontender to palpation. Neck:  Supple. Normal ROM. Respiratory:  No respiratory distress. No labored breathing. Lungs with mild coarse rhonchi to the bases. Negative wheezing. Negative rales. Oxygen saturation 98% on room air. Cardiovascular:  Regular rate. No Murmur. No peripheral edema. Extremities warm and good color. Extremities:  Normal ROM. Nontender to palpation. Atraumatic. Back:  Normal ROM. Nontender to palpation. Neuro:  Alert and Oriented to person, place, time and situation. Normal LOC. Moves all extremities. Speech fluent. Psych:  Calm and Cooperative. Normal thought process. Normal judgement. Lab / Imaging Results   (All laboratory and radiology results have been personally reviewed by myself)  Labs:  No results found for this visit on 11/13/21. Imaging:   All Radiology results interpreted by Radiologist unless otherwise noted. XR CHEST (2 VW)   Final Result   No acute findings in the lungs. No change from priors. ED Course / Medical Decision Making   Medications - No data to display     Re-examination:  11/13/21       Time:  1600 patient asking for that steroid shot that always helps. In the computer I see that she was given Solu-Medrol IM, steroid pack and Z-Nate in October and again just earlier this month. I did explain to her that she does not need all of that medicine again. In addition she should not be on steroids that much, its been a mess of her immune system. Patient then tells me she was just at her doctors 2 days ago. They did not give her steroids. They did not give her antibiotics. They gave her a \"blue pill\" to help with her congestion. In addition patient did not tell them that she needs another nebulizer machine. Patients condition . Consults:   None    Procedures:   none    Medical Decision Making:       Assessment     1. Viral URI with cough New Problem     Plan   Discharged home. Patient condition is good    New Medications     New Prescriptions    PREDNISONE (DELTASONE) 10 MG TABLET    Take 4 tablets by mouth daily for 5 days    PSEUDOEPHEDRINE-GUAIFENESIN (MUCINEX D)  MG PER EXTENDED RELEASE TABLET    Take 1 tablet by mouth every 12 hours for 5 days     Electronically signed by TAVARES Epps   DD: 11/13/21  **This report was transcribed using voice recognition software. Every effort was made to ensure accuracy; however, inadvertent computerized transcription errors may be present.   END OF ED PROVIDER NOTE         eDlma Epps  11/13/21 3561

## 2022-01-03 ENCOUNTER — HOSPITAL ENCOUNTER (EMERGENCY)
Age: 62
Discharge: LEFT BEFORE DISCHARGE | End: 2022-01-03

## 2022-01-03 ENCOUNTER — HOSPITAL ENCOUNTER (EMERGENCY)
Age: 62
Discharge: HOME | End: 2022-01-03
Payer: COMMERCIAL

## 2022-01-03 ENCOUNTER — APPOINTMENT (OUTPATIENT)
Dept: GENERAL RADIOLOGY | Age: 62
End: 2022-01-03
Payer: COMMERCIAL

## 2022-01-03 VITALS
DIASTOLIC BLOOD PRESSURE: 70 MMHG | BODY MASS INDEX: 43.39 KG/M2 | WEIGHT: 270 LBS | RESPIRATION RATE: 20 BRPM | HEIGHT: 66 IN | OXYGEN SATURATION: 98 % | SYSTOLIC BLOOD PRESSURE: 125 MMHG | HEART RATE: 95 BPM | TEMPERATURE: 98.6 F

## 2022-01-03 DIAGNOSIS — J20.9 ACUTE BRONCHITIS, UNSPECIFIED ORGANISM: Primary | ICD-10-CM

## 2022-01-03 PROCEDURE — 96372 THER/PROPH/DIAG INJ SC/IM: CPT

## 2022-01-03 PROCEDURE — 71046 X-RAY EXAM CHEST 2 VIEWS: CPT

## 2022-01-03 PROCEDURE — 6360000002 HC RX W HCPCS: Performed by: PHYSICIAN ASSISTANT

## 2022-01-03 PROCEDURE — 99212 OFFICE O/P EST SF 10 MIN: CPT

## 2022-01-03 RX ORDER — METHYLPREDNISOLONE SODIUM SUCCINATE 125 MG/2ML
125 INJECTION, POWDER, LYOPHILIZED, FOR SOLUTION INTRAMUSCULAR; INTRAVENOUS ONCE
Status: COMPLETED | OUTPATIENT
Start: 2022-01-03 | End: 2022-01-03

## 2022-01-03 RX ORDER — PREDNISONE 20 MG/1
TABLET ORAL
Qty: 8 TABLET | Refills: 0 | Status: SHIPPED | OUTPATIENT
Start: 2022-01-04 | End: 2022-02-18 | Stop reason: ALTCHOICE

## 2022-01-03 RX ORDER — BENZONATATE 200 MG/1
200 CAPSULE ORAL 3 TIMES DAILY PRN
Qty: 15 CAPSULE | Refills: 0 | Status: SHIPPED | OUTPATIENT
Start: 2022-01-03 | End: 2022-01-31 | Stop reason: SDUPTHER

## 2022-01-03 RX ORDER — DOXYCYCLINE HYCLATE 100 MG
100 TABLET ORAL 2 TIMES DAILY
Qty: 14 TABLET | Refills: 0 | Status: SHIPPED | OUTPATIENT
Start: 2022-01-03 | End: 2022-01-10

## 2022-01-03 RX ADMIN — METHYLPREDNISOLONE SODIUM SUCCINATE 125 MG: 125 INJECTION, POWDER, FOR SOLUTION INTRAMUSCULAR; INTRAVENOUS at 14:17

## 2022-01-03 NOTE — ED PROVIDER NOTES
[address] [facility]  Department of Emergency Medicine  UC Encounter Note  1/3/22   2:01 PM EST      NAME: Lilli Stewart  :  1960  MRN:  [id number]    Chief Complaint: Cough (started 3 days ago with cough congestion  wants shot) and Nasal Congestion      This is a 57year-old patient presents to urgent care with complaint of cough and chest congestion and some wheezing. States has been taking an inhaler at home. There is been URI symptoms as well. Denies abdominal pain nausea vomiting diarrhea or urinary symptoms. On first contact patient patient is in no acute distress. Review of Systems  Pertinent positives and negatives are stated within HPI, all other systems reviewed and are negative. Physical Exam  Vitals and nursing note reviewed. Constitutional:       Appearance: She is well-developed. HENT:      Head: Normocephalic and atraumatic. Right Ear: Hearing and external ear normal.      Left Ear: Hearing and external ear normal.      Nose: Nose normal.      Mouth/Throat:      Pharynx: Uvula midline. Eyes:      General: Lids are normal.      Conjunctiva/sclera: Conjunctivae normal.      Pupils: Pupils are equal, round, and reactive to light. Cardiovascular:      Rate and Rhythm: Normal rate and regular rhythm. Heart sounds: Normal heart sounds. No murmur heard. Pulmonary:      Effort: Pulmonary effort is normal.      Breath sounds: Wheezing present. Abdominal:      General: Bowel sounds are normal.      Palpations: Abdomen is soft. Abdomen is not rigid. Tenderness: There is no abdominal tenderness. There is no guarding or rebound. Musculoskeletal:      Cervical back: Normal range of motion and neck supple. Skin:     General: Skin is warm and dry. Findings: No abrasion or rash. Neurological:      General: No focal deficit present. Mental Status: She is alert and oriented to person, place, and time.       GCS: GCS eye subscore is 4. GCS verbal subscore is 5. GCS motor subscore is 6. Cranial Nerves: No cranial nerve deficit. Sensory: No sensory deficit. Coordination: Coordination normal.      Gait: Gait normal.         Procedures    MDM  Number of Diagnoses or Management Options  Acute bronchitis, unspecified organism  Diagnosis management comments: No acute distress. meds and instructions discussed. --------------------------------------------- PAST HISTORY ---------------------------------------------  Past Medical History:  has a past medical history of Arthritis, Asthma, COPD (chronic obstructive pulmonary disease) ([facility] Depressed, Diabetes mellitus ([facility] Fibromyalgia, H/O cardiovascular stress test, Hepatitis, HIV (human immunodeficiency virus infection) ([facility] Hyperlipidemia, Hypertension, MS (multiple sclerosis) ([facility] Respiratory disease, Sleep apnea, and Sleep apnea. Past Surgical History:  has a past surgical history that includes sinus surgery; Hysterectomy; Tonsillectomy; Colonoscopy; and Tonsillectomy. Social History:  reports that she has never smoked. She has never used smokeless tobacco. She reports that she does not drink alcohol and does not use drugs. Family History: family history includes Cancer in her sister; High Cholesterol in her father and mother; Hypertension in her mother; Osteoarthritis in her father, mother, and sister. The patients home medications have been reviewed. Allergies: Amoxicillin, Augmentin [amoxicillin-pot clavulanate], Clavulanic acid, and Chlorphen-pe-acetaminophen    -------------------------------------------------- RESULTS -------------------------------------------------  No results found for this visit on 01/03/22. XR CHEST (2 VW)   Final Result   No acute cardiopulmonary process.              ------------------------- NURSING NOTES AND VITALS REVIEWED ---------------------------   The nursing notes within the ED encounter and vital signs as below have been reviewed. /70   Pulse 95   Temp 98.6 °F (37 °C) (Infrared)   Resp 20   Ht 5' 6\" (1.676 m)   Wt 270 lb (122.5 kg)   SpO2 98%   BMI 43.58 kg/m²   Oxygen Saturation Interpretation: Normal      ------------------------------------------ PROGRESS NOTES ------------------------------------------   I have spoken with the patient and discussed todays results, in addition to providing specific details for the plan of care and counseling regarding the diagnosis and prognosis. Their questions are answered at this time and they are agreeable with the plan.      --------------------------------- ADDITIONAL PROVIDER NOTES ---------------------------------     This patient is stable for discharge. I have shared the specific conditions for return, as well as the importance of follow-up. * NOTE: This report was transcribed using voice recognition software. Every effort was made to ensure accuracy; however, inadvertent computerized transcription errors may be present.    --------------------------------- IMPRESSION AND DISPOSITION ---------------------------------    IMPRESSION  1.  Acute bronchitis, unspecified organism        DISPOSITION  Disposition: Discharge to home  Patient condition is good        Lincoln Mclaughlin PA-C  01/03/22 3156

## 2022-01-31 ENCOUNTER — HOSPITAL ENCOUNTER (EMERGENCY)
Age: 62
Discharge: HOME | End: 2022-01-31
Payer: COMMERCIAL

## 2022-01-31 VITALS
TEMPERATURE: 97.3 F | WEIGHT: 250 LBS | RESPIRATION RATE: 20 BRPM | DIASTOLIC BLOOD PRESSURE: 64 MMHG | BODY MASS INDEX: 40.18 KG/M2 | OXYGEN SATURATION: 96 % | SYSTOLIC BLOOD PRESSURE: 117 MMHG | HEIGHT: 66 IN | HEART RATE: 96 BPM

## 2022-01-31 DIAGNOSIS — J01.90 ACUTE SINUSITIS, RECURRENCE NOT SPECIFIED, UNSPECIFIED LOCATION: ICD-10-CM

## 2022-01-31 DIAGNOSIS — J20.9 ACUTE BRONCHITIS, UNSPECIFIED ORGANISM: Primary | ICD-10-CM

## 2022-01-31 PROCEDURE — 99211 OFF/OP EST MAY X REQ PHY/QHP: CPT

## 2022-01-31 RX ORDER — ALBUTEROL SULFATE 90 UG/1
2 AEROSOL, METERED RESPIRATORY (INHALATION) 4 TIMES DAILY PRN
Qty: 1 EACH | Refills: 0 | Status: SHIPPED | OUTPATIENT
Start: 2022-01-31 | End: 2023-01-31

## 2022-01-31 RX ORDER — TIZANIDINE 4 MG/1
4 TABLET ORAL 2 TIMES DAILY PRN
Qty: 12 TABLET | Refills: 0 | Status: SHIPPED | OUTPATIENT
Start: 2022-01-31

## 2022-01-31 RX ORDER — BENZONATATE 200 MG/1
200 CAPSULE ORAL 3 TIMES DAILY PRN
Qty: 15 CAPSULE | Refills: 0 | Status: SHIPPED | OUTPATIENT
Start: 2022-01-31 | End: 2022-09-05

## 2022-01-31 RX ORDER — DOXYCYCLINE HYCLATE 100 MG
100 TABLET ORAL 2 TIMES DAILY
Qty: 14 TABLET | Refills: 0 | Status: SHIPPED | OUTPATIENT
Start: 2022-01-31 | End: 2022-02-07

## 2022-01-31 NOTE — ED PROVIDER NOTES
[address] [facility]  Department of Emergency Medicine  UC Encounter Note  22   5:56 PM EST      NAME: Elisabeth Soliz  :  1960  MRN:  [id number]    Chief Complaint: Otalgia (started 4 days ago left ear pain congestion  weak  head hurts), Fatigue, and Chest Congestion      This is a 27-year-old female the presents to urgent care complaining of sinus pain pressure earache cough chest congestion she does have an inhaler. She does have history of bronchitis. She denies any shortness of breath. No abdominal pain nausea vomiting diarrhea or urinary symptoms. She does complain of some body aches. On first contact patient she appears to be in no acute distress. Review of Systems  Pertinent positives and negatives are stated within HPI, all other systems reviewed and are negative. Physical Exam  Vitals and nursing note reviewed. Constitutional:       Appearance: She is well-developed. HENT:      Head: Normocephalic and atraumatic. Jaw: There is normal jaw occlusion. Right Ear: Hearing and external ear normal. Tympanic membrane is bulging. Left Ear: Hearing and external ear normal. Tympanic membrane is bulging. Nose:      Right Sinus: Frontal sinus tenderness present. Left Sinus: Frontal sinus tenderness present. Mouth/Throat:      Pharynx: Oropharynx is clear. Uvula midline. Eyes:      General: Lids are normal.      Conjunctiva/sclera: Conjunctivae normal.      Pupils: Pupils are equal, round, and reactive to light. Cardiovascular:      Rate and Rhythm: Normal rate and regular rhythm. Heart sounds: Normal heart sounds. No murmur heard. Pulmonary:      Effort: Pulmonary effort is normal.      Breath sounds: Wheezing present. Abdominal:      General: Bowel sounds are normal.      Palpations: Abdomen is soft. Abdomen is not rigid. Tenderness: There is no abdominal tenderness. There is no guarding or rebound. Musculoskeletal:      Cervical back: Full passive range of motion without pain, normal range of motion and neck supple. Skin:     General: Skin is warm and dry. Findings: No abrasion or rash. Neurological:      General: No focal deficit present. Mental Status: She is alert and oriented to person, place, and time. GCS: GCS eye subscore is 4. GCS verbal subscore is 5. GCS motor subscore is 6. Cranial Nerves: No cranial nerve deficit. Sensory: No sensory deficit. Coordination: Coordination normal.      Gait: Gait normal.         Procedures    MDM  Number of Diagnoses or Management Options  Acute bronchitis, unspecified organism  Acute sinusitis, recurrence not specified, unspecified location  Diagnosis management comments: Patient is in no acute distress. Patient does not want anything steroids. Placed on antibiotic inhaler and cough medication and I will add some Zanaflex she does have some body aches she want something to take for that. Instructions given.           --------------------------------------------- PAST HISTORY ---------------------------------------------  Past Medical History:  has a past medical history of Arthritis, Asthma, [facility]ch[facility]) [facility] Depressed, Diabetes mellitus [facility] Fibromyalgia, H/O cardiovascular stress test, Hepatitis, HIV (human immunodeficiency virus infection) ([facility] Hyperlipidemia, Hypertension, MS (multiple sclerosis) ([facility] Respiratory disease, Sleep apnea, and Sleep apnea. Past Surgical History:  has a past surgical history that includes sinus surgery; Hysterectomy; Tonsillectomy; Colonoscopy; and Tonsillectomy. Social History:  reports that she has never smoked. She has never used smokeless tobacco. She reports that she does not drink alcohol and does not use drugs.     Family History: family history includes Cancer in her sister; High Cholesterol in her father and mother; Hypertension in her mother; Osteoarthritis in her father, mother, and sister. The patients home medications have been reviewed. Allergies: Amoxicillin, Augmentin [amoxicillin-pot clavulanate], Clavulanic acid, and Chlorphen-pe-acetaminophen    -------------------------------------------------- RESULTS -------------------------------------------------  No results found for this visit on 01/31/22. No orders to display       ------------------------- NURSING NOTES AND VITALS REVIEWED ---------------------------   The nursing notes within the ED encounter and vital signs as below have been reviewed. /64   Pulse 96   Temp 97.3 °F (36.3 °C)   Resp 20   Ht 5' 6\" (1.676 m)   Wt 250 lb (113.4 kg)   SpO2 96%   BMI 40.35 kg/m²   Oxygen Saturation Interpretation: Normal      ------------------------------------------ PROGRESS NOTES ------------------------------------------   I have spoken with the patient and discussed todays results, in addition to providing specific details for the plan of care and counseling regarding the diagnosis and prognosis. Their questions are answered at this time and they are agreeable with the plan.      --------------------------------- ADDITIONAL PROVIDER NOTES ---------------------------------     This patient is stable for discharge. I have shared the specific conditions for return, as well as the importance of follow-up. * NOTE: This report was transcribed using voice recognition software. Every effort was made to ensure accuracy; however, inadvertent computerized transcription errors may be present.    --------------------------------- IMPRESSION AND DISPOSITION ---------------------------------    IMPRESSION  1. Acute bronchitis, unspecified organism    2.  Acute sinusitis, recurrence not specified, unspecified location        DISPOSITION  Disposition: Discharge to home  Patient condition is good        Rd Starkey PA-C  01/31/22 3103

## 2022-02-18 ENCOUNTER — HOSPITAL ENCOUNTER (EMERGENCY)
Age: 62
Discharge: HOME | End: 2022-02-18
Payer: COMMERCIAL

## 2022-02-18 ENCOUNTER — APPOINTMENT (OUTPATIENT)
Dept: GENERAL RADIOLOGY | Age: 62
End: 2022-02-18
Payer: COMMERCIAL

## 2022-02-18 VITALS
HEART RATE: 95 BPM | WEIGHT: 270 LBS | OXYGEN SATURATION: 99 % | BODY MASS INDEX: 43.58 KG/M2 | TEMPERATURE: 98.6 F | RESPIRATION RATE: 22 BRPM | DIASTOLIC BLOOD PRESSURE: 66 MMHG | SYSTOLIC BLOOD PRESSURE: 147 MMHG

## 2022-02-18 DIAGNOSIS — J45.901 EXACERBATION OF ASTHMA, UNSPECIFIED ASTHMA SEVERITY, UNSPECIFIED WHETHER PERSISTENT: Primary | ICD-10-CM

## 2022-02-18 PROCEDURE — 99211 OFF/OP EST MAY X REQ PHY/QHP: CPT

## 2022-02-18 PROCEDURE — 71046 X-RAY EXAM CHEST 2 VIEWS: CPT

## 2022-02-18 RX ORDER — PREDNISONE 20 MG/1
20 TABLET ORAL DAILY
Qty: 5 TABLET | Refills: 0 | Status: SHIPPED | OUTPATIENT
Start: 2022-02-18 | End: 2022-02-23

## 2022-02-18 RX ORDER — AZITHROMYCIN 250 MG/1
TABLET, FILM COATED ORAL
Qty: 6 TABLET | Refills: 0 | Status: SHIPPED | OUTPATIENT
Start: 2022-02-18 | End: 2022-02-28

## 2022-02-19 NOTE — ED PROVIDER NOTES
Department of Emergency [address] [facility]  Provider Note  Admit Date/Time: 2022  7:05 PM  Room:   NAME: Nico Prajapati  : 1960  MRN: [id number]     Chief Complaint:  Shortness of Breath (pt feels short of breath for 3 weeks. pt states that her inhalers are not helping. no coughing. pt concerned that she stopped taking water pill ? related)    History of Present Illness        Nico Prajapati is a 57 y.o. female who has a past medical history of:   Past Medical History:   Diagnosis Date    Arthritis     Asthma     COPD (chronic obstructive [facility] disease) ([facility]     Depressed     Diabetes mellitus ([facility]     Fibromyalgia     H/O cardiovascular stress test 2021    Lexiscan    Hepatitis     HIV (human immunodeficiency virus infection) ([facility]     Hyperlipidemia     Hypertension     MS (multiple sclerosis) ([facility]     Respiratory disease     Sleep apnea     Sleep apnea     presents to the urgent care center by private car for Shortness of breath. Chronic she has COPD and asthma comes in frequently for steroid injections and antibiotics for her asthma she uses her inhalers but she said they are not really helping. He does not have chest pain she does not have swelling. She said she just recently restarted her diuretics and she is wondering if she has fluid in her lungs. ROS    Pertinent positives and negatives are stated within HPI, all other systems reviewed and are negative. Past Surgical History:   Procedure Laterality Date    COLONOSCOPY      HYSTERECTOMY      SINUS SURGERY      TONSILLECTOMY      TONSILLECTOMY     Social History:  reports that she has never smoked. She has never used smokeless tobacco. She reports that she does not drink alcohol and does not use drugs.   Family History: family history includes Cancer in her sister; High Cholesterol in her father and mother; Hypertension in her mother; Osteoarthritis in her father, mother, and sister. Allergies: Amoxicillin, Augmentin [amoxicillin-pot clavulanate], Clavulanic acid, and Chlorphen-pe-acetaminophen    Physical Exam   Oxygen Saturation Interpretation: Normal.   ED Triage Vitals [02/18/22 1906]   BP Temp Temp src Pulse Resp SpO2 Height Weight   (!) 147/66 98.6 °F (37 °C) -- 95 22 99 % -- 270 lb (122.5 kg)       Physical Exam  · Constitutional/General: Alert and oriented x3, well appearing, non toxic in NAD  · HEENT:  NC/NT. PERRLA,  Airway patent. · Neck: Supple, full ROM,  ·   · Respiratory: Wheezing throughout   · CV:  Regular rate. Regular rhythm. No murmurs, gallops, or rubs. 2+ distal pulses no edema  · Chest: No chest wall tenderness  · GI:  Abdomen Soft, Non tender, Non distended. +BS. No rebound, guarding, or rigidity. No pulsatile masses. · Musculoskeletal: Moves all extremities x 4.   · Integument: skin warm and dry. No rashes. · Lymphatic: no lymphadenopathy noted  · Neurologic: GCS 15, no focal deficits, symmetric strength 5/5 in the upper and lower extremities bilaterally  · Psychiatric: Normal Affect    Lab / Imaging Results   (All laboratory and radiology results have been personally reviewed by myself)  Labs:  No results found for this visit on 02/18/22. Imaging: All Radiology results interpreted by Radiologist unless otherwise noted. XR CHEST (2 VW)   Final Result   No acute cardiopulmonary abnormality. ED Course / Medical Decision Making   Medications - No data to display       Consult(s):   None    MDM: X-ray was negative there was no signs of CHF. She has asthma and COPD which is chronic she is here frequently for respiratory problems she is wheezing.   She has inhalers and nebulizers to use I did tell her she needs to see her doctor and get referred to a pulmonologist since this is a chronic problem and seems to be worsening I did put her on some prednisone she wanted low-dose I did 20 mg daily and also ordered her a Z-Nate and she should follow-up with her PCP go to the ED if any chest pain or worsening symptoms    Assessment      1. Exacerbation of asthma, unspecified asthma severity, unspecified whether persistent      Plan   Discharge to home and advised to contact No follow-up provider specified. Patient condition is good    New Medications     New Prescriptions    AZITHROMYCIN (ZITHROMAX Z-MARIO) 250 MG TABLET    Take 2 tabs on day one, followed by 1 tablet daily for 4 days. PREDNISONE (DELTASONE) 20 MG TABLET    Take 1 tablet by mouth daily for 5 days     Electronically signed by HERBERT Noe CNP   DD: 2/18/22  **This report was transcribed using voice recognition software. Every effort was made to ensure accuracy; however, inadvertent computerized transcription errors may be present.   END OF ED PROVIDER NOTE     HERBERT Noe CNP  02/18/22 2030

## 2022-04-02 ENCOUNTER — HOSPITAL ENCOUNTER (EMERGENCY)
Age: 62
Discharge: HOME | End: 2022-04-03
Payer: COMMERCIAL

## 2022-04-02 ENCOUNTER — APPOINTMENT (OUTPATIENT)
Dept: CT IMAGING | Age: 62
End: 2022-04-02
Payer: COMMERCIAL

## 2022-04-02 VITALS
RESPIRATION RATE: 16 BRPM | DIASTOLIC BLOOD PRESSURE: 65 MMHG | SYSTOLIC BLOOD PRESSURE: 126 MMHG | TEMPERATURE: 98.6 F | BODY MASS INDEX: 43.58 KG/M2 | WEIGHT: 270 LBS | OXYGEN SATURATION: 100 % | HEART RATE: 92 BPM

## 2022-04-02 DIAGNOSIS — S09.90XA INJURY OF HEAD, INITIAL ENCOUNTER: Primary | ICD-10-CM
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
## 2022-04-02 PROCEDURE — 70450 CT HEAD/BRAIN W/O DYE: CPT

## 2022-04-02 PROCEDURE — 99211 OFF/OP EST MAY X REQ PHY/QHP: CPT

## 2022-04-03 NOTE — ED NOTES
Sent to [facility] radiology for CT head.  Left a voicemail for radiology to hold and call     Nando Teresa LPN  94/82/57 [id number]
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
## 2022-05-30 ENCOUNTER — APPOINTMENT (OUTPATIENT)
Dept: GENERAL RADIOLOGY | Age: 62
End: 2022-05-30
Payer: COMMERCIAL

## 2022-05-30 ENCOUNTER — HOSPITAL ENCOUNTER (EMERGENCY)
Age: 62
Discharge: HOME | End: 2022-05-30
Payer: COMMERCIAL

## 2022-05-30 VITALS
DIASTOLIC BLOOD PRESSURE: 76 MMHG | HEART RATE: 93 BPM | SYSTOLIC BLOOD PRESSURE: 149 MMHG | TEMPERATURE: 97 F | BODY MASS INDEX: 40.18 KG/M2 | HEIGHT: 66 IN | OXYGEN SATURATION: 100 % | WEIGHT: 250 LBS | RESPIRATION RATE: 18 BRPM

## 2022-05-30 DIAGNOSIS — K59.00 CONSTIPATION, UNSPECIFIED CONSTIPATION TYPE: Primary | ICD-10-CM

## 2022-05-30 PROCEDURE — 96372 THER/PROPH/DIAG INJ SC/IM: CPT

## 2022-05-30 PROCEDURE — 74019 RADEX ABDOMEN 2 VIEWS: CPT

## 2022-05-30 PROCEDURE — 6360000002 HC RX W HCPCS: Performed by: PHYSICIAN ASSISTANT

## 2022-05-30 PROCEDURE — 99211 OFF/OP EST MAY X REQ PHY/QHP: CPT

## 2022-05-30 RX ORDER — MAGNESIUM CARB/ALUMINUM HYDROX 105-160MG
TABLET,CHEWABLE ORAL
Qty: 300 ML | Refills: 0 | Status: SHIPPED | OUTPATIENT
Start: 2022-05-30

## 2022-05-30 RX ORDER — KETOROLAC TROMETHAMINE 30 MG/ML
30 INJECTION, SOLUTION INTRAMUSCULAR; INTRAVENOUS ONCE
Status: COMPLETED | OUTPATIENT
Start: 2022-05-30 | End: 2022-05-30

## 2022-05-30 RX ORDER — DICYCLOMINE HCL 20 MG
20 TABLET ORAL EVERY 6 HOURS PRN
Qty: 12 TABLET | Refills: 0 | Status: SHIPPED | OUTPATIENT
Start: 2022-05-30

## 2022-05-30 RX ADMIN — KETOROLAC TROMETHAMINE 30 MG: 30 INJECTION, SOLUTION INTRAMUSCULAR; INTRAVENOUS at 16:50

## 2022-05-30 ASSESSMENT — PAIN DESCRIPTION - LOCATION
LOCATION: ABDOMEN
LOCATION: ABDOMEN

## 2022-05-30 ASSESSMENT — PAIN SCALES - GENERAL
PAINLEVEL_OUTOF10: 10
PAINLEVEL_OUTOF10: 10

## 2022-05-30 ASSESSMENT — PAIN - FUNCTIONAL ASSESSMENT: PAIN_FUNCTIONAL_ASSESSMENT: 0-10

## 2022-05-30 ASSESSMENT — PAIN DESCRIPTION - DESCRIPTORS
DESCRIPTORS: BURNING;CRAMPING;DISCOMFORT
DESCRIPTORS: ACHING;JABBING;THROBBING

## 2022-05-30 NOTE — ED PROVIDER NOTES
[address] [facility]  Department of Emergency Medicine  UC Encounter Note  22   4:07 PM EDT      NAME: Ana Dailey  :  1960  MRN:  [id number]    Chief Complaint: Abdominal Pain (no emesis or diarrhea has this for 24 hours)      This is a 50-year-old female the presents to urgent care complaining of some abdominal pain. She states that she has been having bowel movements at times. She denies urinary symptoms. On first contact patient patient appears to be in no acute distress. Review of Systems  Pertinent positives and negatives are stated within HPI, all other systems reviewed and are negative. Physical Exam  Vitals and nursing note reviewed. Constitutional:       Appearance: She is well-developed. HENT:      Head: Normocephalic and atraumatic. Right Ear: Hearing and external ear normal.      Left Ear: Hearing and external ear normal.      Nose: Nose normal.      Mouth/Throat:      Pharynx: Uvula midline. Eyes:      General: Lids are normal.      Conjunctiva/sclera: Conjunctivae normal.      Pupils: Pupils are equal, round, and reactive to light. Cardiovascular:      Rate and Rhythm: Normal rate and regular rhythm. Heart sounds: Normal heart sounds. No murmur heard. Pulmonary:      Effort: Pulmonary effort is normal.      Breath sounds: Normal breath sounds. Abdominal:      General: Bowel sounds are normal.      Palpations: Abdomen is soft. Abdomen is not rigid. Tenderness: There is no abdominal tenderness. There is no guarding or rebound. Musculoskeletal:      Cervical back: Normal range of motion and neck supple. Skin:     General: Skin is warm and dry. Findings: No abrasion or rash. Neurological:      General: No focal deficit present. Mental Status: She is alert and oriented to person, place, and time. GCS: GCS eye subscore is 4. GCS verbal subscore is 5. GCS motor subscore is 6. Cranial Nerves:  No cranial nerve deficit. Sensory: No sensory deficit. Coordination: Coordination normal.      Gait: Gait normal.         Procedures    MDM  Number of Diagnoses or Management Options  Constipation, unspecified constipation type  Diagnosis management comments: X-ray does show constipation. Will place on magnesium citrate. Also some Bentyl as well. Instructions given.           --------------------------------------------- PAST HISTORY ---------------------------------------------  Past Medical History:  has a past medical history of Arthritis, Asthma, COPD (chronic obstructive pulmonary disease) ([facility] Depressed, Diabetes mellitus ([facility] Fibromyalgia, H/O cardiovascular stress test, Hepatitis, HIV (human immunodeficiency virus infection) ([facility] Hyperlipidemia, Hypertension, MS (multiple sclerosis) ([facility] Respiratory disease, Sleep apnea, and Sleep apnea. Past Surgical History:  has a past surgical history that includes sinus surgery; Hysterectomy; Tonsillectomy; Colonoscopy; and Tonsillectomy. Social History:  reports that she has never smoked. She has never used smokeless tobacco. She reports that she does not drink alcohol and does not use drugs. Family History: family history includes Cancer in her sister; High Cholesterol in her father and mother; Hypertension in her mother; Osteoarthritis in her father, mother, and sister. The patients home medications have been reviewed. Allergies: Amoxicillin, Augmentin [amoxicillin-pot clavulanate], Clavulanic acid, and Chlorphen-pe-acetaminophen    -------------------------------------------------- RESULTS -------------------------------------------------  No results found for this visit on 05/30/22.   XR ABDOMEN (2 VIEWS)   Final Result   Diffuse colonic fecal retention.             ------------------------- NURSING NOTES AND VITALS REVIEWED ---------------------------   The nursing notes within the ED encounter and vital signs as below have been reviewed. BP (!) 149/76   Pulse 93   Temp 97 °F (36.1 °C)   Resp 18   Ht 5' 6\" (1.676 m)   Wt 250 lb (113.4 kg)   SpO2 100%   BMI 40.35 kg/m²   Oxygen Saturation Interpretation: Normal      ------------------------------------------ PROGRESS NOTES ------------------------------------------   I have spoken with the patient and discussed todays results, in addition to providing specific details for the plan of care and counseling regarding the diagnosis and prognosis. Their questions are answered at this time and they are agreeable with the plan.      --------------------------------- ADDITIONAL PROVIDER NOTES ---------------------------------     This patient is stable for discharge. I have shared the specific conditions for return, as well as the importance of follow-up. * NOTE: This report was transcribed using voice recognition software. Every effort was made to ensure accuracy; however, inadvertent computerized transcription errors may be present.    --------------------------------- IMPRESSION AND DISPOSITION ---------------------------------    IMPRESSION  1.  Constipation, unspecified constipation type        DISPOSITION  Disposition: Discharge to home  Patient condition is good        Keyon Arthur PA-C  05/30/22 6013

## 2022-06-13 ENCOUNTER — HOSPITAL ENCOUNTER (OUTPATIENT)
Dept: GENERAL RADIOLOGY | Age: 62
Discharge: HOME | End: 2022-06-15
Payer: COMMERCIAL

## 2022-06-13 ENCOUNTER — HOSPITAL ENCOUNTER (OUTPATIENT)
Age: 62
Discharge: HOME | End: 2022-06-15
Payer: COMMERCIAL

## 2022-06-13 DIAGNOSIS — M79.644 PAIN OF FINGER OF RIGHT HAND: ICD-10-CM

## 2022-06-13 PROCEDURE — 73130 X-RAY EXAM OF HAND: CPT

## 2022-06-23 ENCOUNTER — HOSPITAL ENCOUNTER (EMERGENCY)
Age: 62
Discharge: HOME | End: 2022-06-23
Payer: COMMERCIAL

## 2022-06-23 VITALS
WEIGHT: 265 LBS | BODY MASS INDEX: 42.59 KG/M2 | HEART RATE: 92 BPM | RESPIRATION RATE: 20 BRPM | SYSTOLIC BLOOD PRESSURE: 126 MMHG | TEMPERATURE: 97.3 F | HEIGHT: 66 IN | DIASTOLIC BLOOD PRESSURE: 79 MMHG | OXYGEN SATURATION: 98 %

## 2022-06-23 DIAGNOSIS — J20.9 ACUTE BRONCHITIS, UNSPECIFIED ORGANISM: Primary | ICD-10-CM

## 2022-06-23 DIAGNOSIS — J45.909 UNCOMPLICATED ASTHMA, UNSPECIFIED ASTHMA SEVERITY, UNSPECIFIED WHETHER PERSISTENT: ICD-10-CM

## 2022-06-23 PROCEDURE — 99211 OFF/OP EST MAY X REQ PHY/QHP: CPT

## 2022-06-23 RX ORDER — DOXYCYCLINE HYCLATE 100 MG
100 TABLET ORAL 2 TIMES DAILY
Qty: 14 TABLET | Refills: 0 | Status: SHIPPED | OUTPATIENT
Start: 2022-06-23 | End: 2022-06-30

## 2022-06-23 RX ORDER — PREDNISONE 10 MG/1
TABLET ORAL
Qty: 14 TABLET | Refills: 0 | Status: SHIPPED | OUTPATIENT
Start: 2022-06-23 | End: 2022-08-18

## 2022-06-23 ASSESSMENT — PAIN - FUNCTIONAL ASSESSMENT: PAIN_FUNCTIONAL_ASSESSMENT: NONE - DENIES PAIN

## 2022-06-23 NOTE — ED PROVIDER NOTES
[address] [facility]  Department of Emergency Medicine  UC Encounter Note  22   10:55 AM EDT      NAME: Shaila Kurtz  :  1960  MRN:  [id number]    Chief Complaint: URI (started couple days ago   with cough congestion  )      This is a 43-year-old female the presents to urgent care complaining of a flareup of her asthma also she has been having cough and URI symptoms for the past several days. Has been taking some over-the-counter cough and cold medications she states. No vomiting or diarrhea at this time. On first contact patient she appears to be in no acute distress. Review of Systems  Pertinent positives and negatives are stated within HPI, all other systems reviewed and are negative. Physical Exam  Vitals and nursing note reviewed. Constitutional:       Appearance: She is well-developed. HENT:      Head: Normocephalic and atraumatic. Jaw: There is normal jaw occlusion. Right Ear: Hearing, tympanic membrane, ear canal and external ear normal.      Left Ear: Hearing, tympanic membrane, ear canal and external ear normal.      Nose: Nose normal.      Mouth/Throat:      Mouth: Mucous membranes are moist. No angioedema. Pharynx: Oropharynx is clear. Uvula midline. Comments: Oropharynx patent  Eyes:      General: Lids are normal.      Conjunctiva/sclera: Conjunctivae normal.      Pupils: Pupils are equal, round, and reactive to light. Cardiovascular:      Rate and Rhythm: Normal rate and regular rhythm. Heart sounds: Normal heart sounds. No murmur heard. Pulmonary:      Effort: Pulmonary effort is normal.      Breath sounds: Wheezing present. Abdominal:      General: Bowel sounds are normal.      Palpations: Abdomen is soft. Abdomen is not rigid. Tenderness: There is no abdominal tenderness. There is no guarding or rebound. Musculoskeletal:      Cervical back: Normal range of motion and neck supple.    Skin:     General: Skin is warm and dry. Findings: No abrasion or rash. Neurological:      General: No focal deficit present. Mental Status: She is alert and oriented to person, place, and time. GCS: GCS eye subscore is 4. GCS verbal subscore is 5. GCS motor subscore is 6. Cranial Nerves: No cranial nerve deficit. Sensory: No sensory deficit. Coordination: Coordination normal.      Gait: Gait normal.         Procedures    MDM  Number of Diagnoses or Management Options  Acute bronchitis, unspecified organism  Uncomplicated asthma, unspecified asthma severity, unspecified whether persistent  Diagnosis management comments: Patient is in no acute distress. Does have history of asthma. Continue inhaler and cough and cold medications. Add steroid and antibiotic. Instructions given.           --------------------------------------------- PAST HISTORY ---------------------------------------------  Past Medical History:  has a past medical history of Arthritis, Asthma, COPD (chronic obstructive pulmonary disease) ([facility] Depressed, Diabetes mellitus ([facility] Fibromyalgia, H/O cardiovascular stress test, Hepatitis, HIV (human immunodeficiency virus infection) ([facility] Hyperlipidemia, Hypertension, MS (multiple sclerosis) ([facility] Respiratory disease, Sleep apnea, and Sleep apnea. Past Surgical History:  has a past surgical history that includes sinus surgery; Hysterectomy; Tonsillectomy; Colonoscopy; and Tonsillectomy. Social History:  reports that she has never smoked. She has never used smokeless tobacco. She reports that she does not drink alcohol and does not use drugs. Family History: family history includes Cancer in her sister; High Cholesterol in her father and mother; Hypertension in her mother; Osteoarthritis in her father, mother, and sister. The patients home medications have been reviewed.     Allergies: Amoxicillin, Augmentin [amoxicillin-pot clavulanate], Clavulanic acid, and Chlorphen-pe-acetaminophen    -------------------------------------------------- RESULTS -------------------------------------------------  No results found for this visit on 06/23/22. No orders to display       ------------------------- NURSING NOTES AND VITALS REVIEWED ---------------------------   The nursing notes within the ED encounter and vital signs as below have been reviewed. /79   Pulse 92   Temp 97.3 °F (36.3 °C) (Infrared)   Resp 20   Ht 5' 6\" (1.676 m)   Wt 265 lb (120.2 kg)   SpO2 98%   BMI 42.77 kg/m²   Oxygen Saturation Interpretation: Normal      ------------------------------------------ PROGRESS NOTES ------------------------------------------   I have spoken with the patient and discussed todays results, in addition to providing specific details for the plan of care and counseling regarding the diagnosis and prognosis. Their questions are answered at this time and they are agreeable with the plan.      --------------------------------- ADDITIONAL PROVIDER NOTES ---------------------------------     This patient is stable for discharge. I have shared the specific conditions for return, as well as the importance of follow-up. * NOTE: This report was transcribed using voice recognition software. Every effort was made to ensure accuracy; however, inadvertent computerized transcription errors may be present.    --------------------------------- IMPRESSION AND DISPOSITION ---------------------------------    IMPRESSION  1. Acute bronchitis, unspecified organism    2.  Uncomplicated asthma, unspecified asthma severity, unspecified whether persistent        DISPOSITION  Disposition: Discharge to home  Patient condition is good       Noemí Thayer PA-C  06/23/22 110

## 2022-08-18 ENCOUNTER — HOSPITAL ENCOUNTER (EMERGENCY)
Age: 62
Discharge: HOME | End: 2022-08-18
Payer: COMMERCIAL

## 2022-08-18 VITALS
DIASTOLIC BLOOD PRESSURE: 77 MMHG | RESPIRATION RATE: 18 BRPM | HEART RATE: 79 BPM | TEMPERATURE: 98.3 F | BODY MASS INDEX: 39.24 KG/M2 | OXYGEN SATURATION: 99 % | SYSTOLIC BLOOD PRESSURE: 138 MMHG | HEIGHT: 67 IN | WEIGHT: 250 LBS

## 2022-08-18 DIAGNOSIS — J44.9 CHRONIC OBSTRUCTIVE PULMONARY DISEASE, UNSPECIFIED COPD TYPE (HCC): Primary | ICD-10-CM

## 2022-08-18 DIAGNOSIS — N32.81 OAB (OVERACTIVE BLADDER): ICD-10-CM

## 2022-08-18 LAB
BASOPHILS ABSOLUTE: 0.04 E9/L (ref 0–0.2)
BASOPHILS RELATIVE PERCENT: 0.5 % (ref 0–2)
BILIRUBIN URINE: NEGATIVE
BLOOD, URINE: NEGATIVE
CLARITY: CLEAR
CO2: 27 MMOL/L (ref 22–29)
COLOR: YELLOW
EOSINOPHILS ABSOLUTE: 0.79 E9/L (ref 0.05–0.5)
EOSINOPHILS RELATIVE PERCENT: 10.7 % (ref 0–6)
GFR AFRICAN AMERICAN: >60
GFR NON-AFRICAN AMERICAN: >60 ML/MIN/1.73
GLUCOSE BLD-MCNC: 105 MG/DL (ref 74–99)
GLUCOSE URINE: 500 MG/DL
HCT VFR BLD CALC: 37.2 % (ref 34–48)
HEMOGLOBIN: 11.7 G/DL (ref 11.5–15.5)
IMMATURE GRANULOCYTES #: 0.03 E9/L
IMMATURE GRANULOCYTES %: 0.4 % (ref 0–5)
KETONES, URINE: NEGATIVE MG/DL
LEUKOCYTE ESTERASE, URINE: NEGATIVE
LYMPHOCYTES ABSOLUTE: 2.04 E9/L (ref 1.5–4)
LYMPHOCYTES RELATIVE PERCENT: 27.7 % (ref 20–42)
MCH RBC QN AUTO: 26.8 PG (ref 26–35)
MCHC RBC AUTO-ENTMCNC: 31.5 % (ref 32–34.5)
MCV RBC AUTO: 85.3 FL (ref 80–99.9)
MONOCYTES ABSOLUTE: 0.66 E9/L (ref 0.1–0.95)
MONOCYTES RELATIVE PERCENT: 9 % (ref 2–12)
NEUTROPHILS ABSOLUTE: 3.8 E9/L (ref 1.8–7.3)
NEUTROPHILS RELATIVE PERCENT: 51.7 % (ref 43–80)
NITRITE, URINE: NEGATIVE
PDW BLD-RTO: 13.3 FL (ref 11.5–15)
PERFORMED ON: ABNORMAL
PH UA: 6 (ref 5–9)
PLATELET # BLD: 333 E9/L (ref 130–450)
PMV BLD AUTO: 8.9 FL (ref 7–12)
POC ANION GAP: 9 MMOL/L (ref 7–16)
POC BUN: 9 MG/DL (ref 8–23)
POC CHLORIDE: 105 MMOL/L (ref 100–108)
POC CREATININE: 0.7 MG/DL (ref 0.5–1)
POC POTASSIUM: 4.3 MMOL/L (ref 3.5–5)
POC SODIUM: 141 MMOL/L (ref 132–146)
PROTEIN UA: NEGATIVE MG/DL
RBC # BLD: 4.36 E12/L (ref 3.5–5.5)
SPECIFIC GRAVITY UA: 1.02 (ref 1–1.03)
UROBILINOGEN, URINE: 0.2 E.U./DL
WBC # BLD: 7.4 E9/L (ref 4.5–11.5)

## 2022-08-18 PROCEDURE — 85025 COMPLETE CBC W/AUTO DIFF WBC: CPT

## 2022-08-18 PROCEDURE — 6360000002 HC RX W HCPCS: Performed by: PHYSICIAN ASSISTANT

## 2022-08-18 PROCEDURE — 80051 ELECTROLYTE PANEL: CPT

## 2022-08-18 PROCEDURE — 99211 OFF/OP EST MAY X REQ PHY/QHP: CPT

## 2022-08-18 PROCEDURE — 81003 URINALYSIS AUTO W/O SCOPE: CPT

## 2022-08-18 PROCEDURE — 96372 THER/PROPH/DIAG INJ SC/IM: CPT

## 2022-08-18 PROCEDURE — 87088 URINE BACTERIA CULTURE: CPT

## 2022-08-18 PROCEDURE — 84520 ASSAY OF UREA NITROGEN: CPT

## 2022-08-18 PROCEDURE — 82565 ASSAY OF CREATININE: CPT

## 2022-08-18 PROCEDURE — 36415 COLL VENOUS BLD VENIPUNCTURE: CPT

## 2022-08-18 PROCEDURE — 82947 ASSAY GLUCOSE BLOOD QUANT: CPT

## 2022-08-18 RX ORDER — PREDNISONE 20 MG/1
TABLET ORAL
Qty: 8 TABLET | Refills: 0 | Status: SHIPPED | OUTPATIENT
Start: 2022-08-18

## 2022-08-18 RX ORDER — OXYBUTYNIN CHLORIDE 5 MG/1
5 TABLET, EXTENDED RELEASE ORAL DAILY
Qty: 20 TABLET | Refills: 0 | Status: SHIPPED | OUTPATIENT
Start: 2022-08-18

## 2022-08-18 RX ORDER — KETOROLAC TROMETHAMINE 30 MG/ML
30 INJECTION, SOLUTION INTRAMUSCULAR; INTRAVENOUS ONCE
Status: COMPLETED | OUTPATIENT
Start: 2022-08-18 | End: 2022-08-18

## 2022-08-18 RX ADMIN — KETOROLAC TROMETHAMINE 30 MG: 30 INJECTION, SOLUTION INTRAMUSCULAR at 09:46

## 2022-08-18 ASSESSMENT — PAIN DESCRIPTION - LOCATION: LOCATION: GENERALIZED

## 2022-08-18 ASSESSMENT — PAIN SCALES - GENERAL: PAINLEVEL_OUTOF10: 8

## 2022-08-18 ASSESSMENT — PAIN - FUNCTIONAL ASSESSMENT: PAIN_FUNCTIONAL_ASSESSMENT: 0-10

## 2022-08-18 ASSESSMENT — PAIN DESCRIPTION - DESCRIPTORS: DESCRIPTORS: ACHING;DISCOMFORT

## 2022-08-18 NOTE — ED NOTES
Patient states that she received some relief of her pain, 5/10.       Sis Monique, BRITNEY  [birth year] 0541

## 2022-08-18 NOTE — ED PROVIDER NOTES
[address] [facility]  Department of Emergency Medicine  UC Encounter Note  22   9:10 AM EDT      NAME: Kiran Walsh  :  1960  MRN:  [id number]    Chief Complaint: Urinary Frequency (Says she needs more medication ) and Other (States her sugar is \"out of whack\" and she needs pain shot and her breathing is bad)      This is a 68-year-old female the presents to urgent care complaining of some urinary frequency. She does have a history of overactive bladder. But she has been out of her medication for some time. Also patient does complain of chronic symptoms such as COPD. Also she complains of some chronic back pain. Also she does complain of history of diabetes and she wants to have her blood sugar checked. On first contact patient she appears to be in no acute distress. Review of Systems  Pertinent positives and negatives are stated within HPI, all other systems reviewed and are negative. Physical Exam  Vitals and nursing note reviewed. Constitutional:       Appearance: She is well-developed. HENT:      Head: Normocephalic and atraumatic. Right Ear: Hearing and external ear normal.      Left Ear: Hearing and external ear normal.      Nose: Nose normal.      Mouth/Throat:      Pharynx: Uvula midline. Eyes:      General: Lids are normal.      Conjunctiva/sclera: Conjunctivae normal.      Pupils: Pupils are equal, round, and reactive to light. Cardiovascular:      Rate and Rhythm: Normal rate and regular rhythm. Heart sounds: Normal heart sounds. No murmur heard. Pulmonary:      Effort: Pulmonary effort is normal.      Breath sounds: Wheezing present. Abdominal:      General: Bowel sounds are normal.      Palpations: Abdomen is soft. Abdomen is not rigid. Tenderness: There is no abdominal tenderness. There is no guarding or rebound. Musculoskeletal:      Cervical back: Normal range of motion and neck supple.    Skin:     General: Skin is warm and dry. Findings: No abrasion or rash. Neurological:      Mental Status: She is alert and oriented to person, place, and time. GCS: GCS eye subscore is 4. GCS verbal subscore is 5. GCS motor subscore is 6. Cranial Nerves: No cranial nerve deficit. Sensory: No sensory deficit. Coordination: Coordination normal.      Gait: Gait normal.       Procedures    MDM  Number of Diagnoses or Management Options  Chronic obstructive pulmonary disease, unspecified COPD type ([facility]  OAB (overactive bladder)  Diagnosis management comments: Patient in no acute distress. Vitals are stable. Blood sugar was 105. She does have some mild wheezing. But she is not short of breath. I told her to continue her inhalers and use a steroid for next couple days for the COPD. She does have some overactive bladder symptoms her urine was negative for infection I will place her on Ditropan again. I do want her to follow-up with her primary care provider if worse go to the ER.             --------------------------------------------- PAST HISTORY ---------------------------------------------  Past Medical History:  has a past medical history of Arthritis, Asthma, COPD (chronic obstructive pulmonary disease) ([facility] Depressed, Diabetes mellitus ([facility] Fibromyalgia, H/O cardiovascular stress test, Hepatitis, HIV (human immunodeficiency virus infection) ([facility] Hyperlipidemia, Hypertension, MS (multiple sclerosis) ([facility] Respiratory disease, Sleep apnea, and Sleep apnea. Past Surgical History:  has a past surgical history that includes sinus surgery; Hysterectomy; Tonsillectomy; Colonoscopy; and Tonsillectomy. Social History:  reports that she has never smoked. She has never used smokeless tobacco. She reports that she does not drink alcohol and does not use drugs.     Family History: family history includes Cancer in her sister; High Cholesterol in her father and mother; Hypertension in her mother; Osteoarthritis in her father, mother, and sister. The patients home medications have been reviewed.     Allergies: Amoxicillin, Augmentin [amoxicillin-pot clavulanate], Clavulanic acid, and Chlorphen-pe-acetaminophen    -------------------------------------------------- RESULTS -------------------------------------------------  Results for orders placed or performed during the hospital encounter of 08/18/22   Urinalysis   Result Value Ref Range    Color, UA Yellow Straw/Yellow    Clarity, UA Clear Clear    Glucose, Ur 500 (A) Negative mg/dL    Bilirubin Urine Negative Negative    Ketones, Urine Negative Negative mg/dL    Specific Gravity, UA 1.025 1.005 - 1.030    Blood, Urine Negative Negative    pH, UA 6.0 5.0 - 9.0    Protein, UA Negative Negative mg/dL    Urobilinogen, Urine 0.2 <2.0 E.U./dL    Nitrite, Urine Negative Negative    Leukocyte Esterase, Urine Negative Negative   CBC with Auto Differential   Result Value Ref Range    WBC 7.4 4.5 - 11.5 E9/L    RBC 4.36 3.50 - 5.50 E12/L    Hemoglobin 11.7 11.5 - 15.5 g/dL    Hematocrit 37.2 34.0 - 48.0 %    MCV 85.3 80.0 - 99.9 fL    MCH 26.8 26.0 - 35.0 pg    MCHC 31.5 (L) 32.0 - 34.5 %    RDW 13.3 11.5 - 15.0 fL    Platelets 833 298 - 426 E9/L    MPV 8.9 7.0 - 12.0 fL    Neutrophils % 51.7 43.0 - 80.0 %    Immature Granulocytes % 0.4 0.0 - 5.0 %    Lymphocytes % 27.7 20.0 - 42.0 %    Monocytes % 9.0 2.0 - 12.0 %    Eosinophils % 10.7 (H) 0.0 - 6.0 %    Basophils % 0.5 0.0 - 2.0 %    Neutrophils Absolute 3.80 1.80 - 7.30 E9/L    Immature Granulocytes # 0.03 E9/L    Lymphocytes Absolute 2.04 1.50 - 4.00 E9/L    Monocytes Absolute 0.66 0.10 - 0.95 E9/L    Eosinophils Absolute 0.79 (H) 0.05 - 0.50 E9/L    Basophils Absolute 0.04 0.00 - 0.20 E9/L   POCT Venous   Result Value Ref Range    POC Sodium 141 132 - 146 mmol/L    POC Potassium 4.3 3.5 - 5.0 mmol/L    POC Chloride 105 100 - 108 mmol/L    CO2 27 22 - 29 mmol/L    POC Anion Gap 9 7 - 16 mmol/L    POC Glucose 105 (H) 74 - 99 mg/dl    POC BUN 9 8 - 23 mg/dL    POC Creatinine 0.7 0.5 - 1.0 mg/dL    GFR Non-African American >60 >=60 mL/min/1.73    GFR  >60     Performed on SEE BELOW      No orders to display       ------------------------- NURSING NOTES AND VITALS REVIEWED ---------------------------   The nursing notes within the ED encounter and vital signs as below have been reviewed. /77   Pulse 79   Temp 98.3 °F (36.8 °C)   Resp 18   Ht 5' 7\" (1.702 m)   Wt 250 lb (113.4 kg)   SpO2 99%   BMI 39.16 kg/m²   Oxygen Saturation Interpretation: Normal      ------------------------------------------ PROGRESS NOTES ------------------------------------------   I have spoken with the patient and discussed todays results, in addition to providing specific details for the plan of care and counseling regarding the diagnosis and prognosis. Their questions are answered at this time and they are agreeable with the plan.      --------------------------------- ADDITIONAL PROVIDER NOTES ---------------------------------     This patient is stable for discharge. I have shared the specific conditions for return, as well as the importance of follow-up. * NOTE: This report was transcribed using voice recognition software. Every effort was made to ensure accuracy; however, inadvertent computerized transcription errors may be present.    --------------------------------- IMPRESSION AND DISPOSITION ---------------------------------    IMPRESSION  1. Chronic obstructive pulmonary disease, unspecified COPD type ([facility]    2.  OAB (overactive bladder)        DISPOSITION  Disposition: Discharge to home  Patient condition is good       Kristi Aleman PA-C  08/18/22 1038

## 2022-08-20 LAB — URINE CULTURE, ROUTINE: NORMAL

## 2022-09-05 ENCOUNTER — HOSPITAL ENCOUNTER (EMERGENCY)
Age: 62
Discharge: HOME | End: 2022-09-05
Payer: COMMERCIAL

## 2022-09-05 VITALS
RESPIRATION RATE: 20 BRPM | OXYGEN SATURATION: 100 % | HEART RATE: 77 BPM | BODY MASS INDEX: 39.16 KG/M2 | WEIGHT: 250 LBS | TEMPERATURE: 97.9 F | SYSTOLIC BLOOD PRESSURE: 137 MMHG | DIASTOLIC BLOOD PRESSURE: 96 MMHG

## 2022-09-05 DIAGNOSIS — J20.9 ACUTE BRONCHITIS, UNSPECIFIED ORGANISM: Primary | ICD-10-CM

## 2022-09-05 PROCEDURE — 6360000002 HC RX W HCPCS: Performed by: PHYSICIAN ASSISTANT

## 2022-09-05 PROCEDURE — 99211 OFF/OP EST MAY X REQ PHY/QHP: CPT

## 2022-09-05 PROCEDURE — 96372 THER/PROPH/DIAG INJ SC/IM: CPT

## 2022-09-05 RX ORDER — BENZONATATE 200 MG/1
200 CAPSULE ORAL 3 TIMES DAILY PRN
Qty: 15 CAPSULE | Refills: 0 | Status: SHIPPED | OUTPATIENT
Start: 2022-09-05

## 2022-09-05 RX ORDER — KETOROLAC TROMETHAMINE 30 MG/ML
30 INJECTION, SOLUTION INTRAMUSCULAR; INTRAVENOUS ONCE
Status: COMPLETED | OUTPATIENT
Start: 2022-09-05 | End: 2022-09-05

## 2022-09-05 RX ORDER — DOXYCYCLINE HYCLATE 100 MG
100 TABLET ORAL 2 TIMES DAILY
Qty: 14 TABLET | Refills: 0 | Status: SHIPPED | OUTPATIENT
Start: 2022-09-05 | End: 2022-09-12

## 2022-09-05 RX ADMIN — KETOROLAC TROMETHAMINE 30 MG: 30 INJECTION, SOLUTION INTRAMUSCULAR at 11:23

## 2022-09-05 ASSESSMENT — PAIN SCALES - GENERAL: PAINLEVEL_OUTOF10: 5

## 2022-09-05 ASSESSMENT — PAIN DESCRIPTION - LOCATION: LOCATION: GENERALIZED

## 2022-09-05 ASSESSMENT — PAIN DESCRIPTION - DESCRIPTORS: DESCRIPTORS: ACHING

## 2022-09-05 NOTE — ED PROVIDER NOTES
[address] [facility]  Department of Emergency Medicine   Encounter Note  22   11:09 AM EDT      NAME: Zia Todd  :  1960  MRN:  [id number]    Chief Complaint: Cough (Chest congestion, productive cough x 4-5 days) and Nasal Congestion (Stuffy/runny)      This is a 60year old female that presents to  with a complaint of cough and congestion and URI symptoms body aches for the past couple days. Denies any shortness of breath. On first contact patient she appears to be in no acute distress. Review of Systems  Pertinent positives and negatives are stated within HPI, all other systems reviewed and are negative. Physical Exam  Vitals and nursing note reviewed. Constitutional:       Appearance: She is well-developed. HENT:      Head: Normocephalic and atraumatic. Right Ear: Hearing, tympanic membrane, ear canal and external ear normal.      Left Ear: Hearing, tympanic membrane, ear canal and external ear normal.      Nose: Rhinorrhea present. Right Sinus: No maxillary sinus tenderness or frontal sinus tenderness. Left Sinus: No maxillary sinus tenderness or frontal sinus tenderness. Mouth/Throat:      Pharynx: Uvula midline. Eyes:      General: Lids are normal.      Conjunctiva/sclera: Conjunctivae normal.      Pupils: Pupils are equal, round, and reactive to light. Cardiovascular:      Rate and Rhythm: Normal rate and regular rhythm. Heart sounds: Normal heart sounds. No murmur heard. Pulmonary:      Effort: Pulmonary effort is normal.      Breath sounds: Normal breath sounds. Abdominal:      General: Bowel sounds are normal.      Palpations: Abdomen is soft. Abdomen is not rigid. Tenderness: There is no abdominal tenderness. There is no guarding or rebound. Musculoskeletal:      Cervical back: Normal range of motion and neck supple. Skin:     General: Skin is warm and dry. Findings: No abrasion or rash. Neurological:      Mental Status: She is alert and oriented to person, place, and time. GCS: GCS eye subscore is 4. GCS verbal subscore is 5. GCS motor subscore is 6. Cranial Nerves: No cranial nerve deficit. Sensory: No sensory deficit. Coordination: Coordination normal.      Gait: Gait normal.       Procedures    MDM           --------------------------------------------- PAST HISTORY ---------------------------------------------  Past Medical History:  has a past medical history of Arthritis, Asthma, COPD (chronic obstructive pulmonary disease) ([facility] [address] Depressed, Diabetes mellitus [facility] Fibromyalgia, H/O cardiovascular stress test, Hepatitis, HIV (human immunodeficiency virus infection) ([facility] Hyperlipidemia, Hypertension, MS (multiple sclerosis) ([facility] Respiratory disease, Sleep apnea, and Sleep apnea. Past Surgical History:  has a past surgical history that includes sinus surgery; Hysterectomy; Tonsillectomy; Colonoscopy; and Tonsillectomy. Social History:  reports that she has never smoked. She has never used smokeless tobacco. She reports that she does not drink alcohol and does not use drugs. Family History: family history includes Cancer in her sister; High Cholesterol in her father and mother; Hypertension in her mother; Osteoarthritis in her father, mother, and sister. The patients home medications have been reviewed. Allergies: Amoxicillin, Augmentin [amoxicillin-pot clavulanate], Clavulanic acid, and Chlorphen-pe-acetaminophen    -------------------------------------------------- RESULTS -------------------------------------------------  No results found for this visit on 09/05/22. No orders to display       ------------------------- NURSING NOTES AND VITALS REVIEWED ---------------------------   The nursing notes within the ED encounter and vital signs as below have been reviewed.    BP (!) 137/96   Pulse 77   Temp 97.9 °F (36.6 °C)   Resp 20   Wt 250 lb (113.4 kg)   SpO2 100%   BMI 39.16 kg/m²   Oxygen Saturation Interpretation: Normal      ------------------------------------------ PROGRESS NOTES ------------------------------------------   I have spoken with the patient and discussed todays results, in addition to providing specific details for the plan of care and counseling regarding the diagnosis and prognosis. Their questions are answered at this time and they are agreeable with the plan.      --------------------------------- ADDITIONAL PROVIDER NOTES ---------------------------------     This patient is stable for discharge. I have shared the specific conditions for return, as well as the importance of follow-up. * NOTE: This report was transcribed using voice recognition software. Every effort was made to ensure accuracy; however, inadvertent computerized transcription errors may be present.    --------------------------------- IMPRESSION AND DISPOSITION ---------------------------------    IMPRESSION  1.  Acute bronchitis, unspecified organism        DISPOSITION  Disposition: Discharge to home  Patient condition is good       Trent Grant PA-C  09/05/22 1127

## 2023-01-07 ENCOUNTER — HOSPITAL ENCOUNTER (EMERGENCY)
Age: 63
Discharge: HOME | End: 2023-01-07
Payer: COMMERCIAL

## 2023-01-07 VITALS
RESPIRATION RATE: 20 BRPM | SYSTOLIC BLOOD PRESSURE: 137 MMHG | OXYGEN SATURATION: 98 % | HEART RATE: 81 BPM | DIASTOLIC BLOOD PRESSURE: 91 MMHG | WEIGHT: 250 LBS | TEMPERATURE: 98.6 F | BODY MASS INDEX: 39.16 KG/M2

## 2023-01-07 DIAGNOSIS — Z20.2 POSSIBLE EXPOSURE TO STD: Primary | ICD-10-CM

## 2023-01-07 LAB
BACTERIA: ABNORMAL /HPF
BILIRUBIN URINE: NEGATIVE
BLOOD, URINE: ABNORMAL
CLARITY: CLEAR
COLOR: YELLOW
EPITHELIAL CELLS, UA: ABNORMAL /HPF
GLUCOSE URINE: NEGATIVE MG/DL
KETONES, URINE: NEGATIVE MG/DL
LEUKOCYTE ESTERASE, URINE: NEGATIVE
NITRITE, URINE: NEGATIVE
PH UA: 5 (ref 5–9)
PROTEIN UA: NEGATIVE MG/DL
RBC UA: ABNORMAL /HPF (ref 0–2)
SOURCE: NORMAL
SPECIFIC GRAVITY UA: >=1.03 (ref 1–1.03)
UROBILINOGEN, URINE: 0.2 E.U./DL
WBC UA: ABNORMAL /HPF (ref 0–5)

## 2023-01-07 PROCEDURE — 81001 URINALYSIS AUTO W/SCOPE: CPT

## 2023-01-07 PROCEDURE — 6360000002 HC RX W HCPCS: Performed by: PHYSICIAN ASSISTANT

## 2023-01-07 PROCEDURE — 87491 CHLMYD TRACH DNA AMP PROBE: CPT

## 2023-01-07 PROCEDURE — 87591 N.GONORRHOEAE DNA AMP PROB: CPT

## 2023-01-07 PROCEDURE — 2500000003 HC RX 250 WO HCPCS: Performed by: PHYSICIAN ASSISTANT

## 2023-01-07 PROCEDURE — 6370000000 HC RX 637 (ALT 250 FOR IP): Performed by: PHYSICIAN ASSISTANT

## 2023-01-07 PROCEDURE — 99211 OFF/OP EST MAY X REQ PHY/QHP: CPT

## 2023-01-07 PROCEDURE — 96372 THER/PROPH/DIAG INJ SC/IM: CPT

## 2023-01-07 RX ORDER — AZITHROMYCIN 250 MG/1
1000 TABLET, FILM COATED ORAL ONCE
Status: COMPLETED | OUTPATIENT
Start: 2023-01-07 | End: 2023-01-07

## 2023-01-07 RX ORDER — CEFTRIAXONE 500 MG/1
500 INJECTION, POWDER, FOR SOLUTION INTRAMUSCULAR; INTRAVENOUS ONCE
Status: COMPLETED | OUTPATIENT
Start: 2023-01-07 | End: 2023-01-07

## 2023-01-07 RX ORDER — LIDOCAINE HYDROCHLORIDE 10 MG/ML
5 INJECTION, SOLUTION INFILTRATION; PERINEURAL ONCE
Status: COMPLETED | OUTPATIENT
Start: 2023-01-07 | End: 2023-01-07

## 2023-01-07 RX ADMIN — CEFTRIAXONE 500 MG: 500 INJECTION, POWDER, FOR SOLUTION INTRAMUSCULAR; INTRAVENOUS at 17:46

## 2023-01-07 RX ADMIN — AZITHROMYCIN MONOHYDRATE 1000 MG: 250 TABLET ORAL at 17:47

## 2023-01-07 RX ADMIN — LIDOCAINE HYDROCHLORIDE 5 ML: 10 INJECTION, SOLUTION INFILTRATION; PERINEURAL at 17:49

## 2023-01-07 ASSESSMENT — PAIN - FUNCTIONAL ASSESSMENT: PAIN_FUNCTIONAL_ASSESSMENT: NONE - DENIES PAIN

## 2023-01-07 NOTE — ED PROVIDER NOTES
Department of Emergency Medicine   ED  Provider Note  Admit Date/RoomTime: 1/7/2023  5:05 PM  ED Room: 07/07 1/7/23  5:36 PM EST      HISTORY OF PRESENT ILLNESS:  (Nurses Notes Reviewed)    Chief Complaint:   Exposure to STD (Wants blood tests for VD, just give her a script for the lab work and she'll take it to [facility])          Rebecca Briones is a 60 y.o. old female presenting to the emergency department for concerns for sexually transmitted infection, which occurred prior to arrival.  Since onset the symptoms have been constant and mild in severity. Symptoms are associated with vaginal itching and denies any :additional complaints. Review of Systems:   Pertinent positives and negatives are stated within HPI, all other systems reviewed and are negative.          --------------------------------------------- PAST HISTORY ---------------------------------------------  Past Medical History:  has a past medical history of Arthritis, Asthma, COPD (chronic obstructive pulmonary disease) ([facility] [address] Depressed, Diabetes mellitus ([facility] [address] Fibromyalgia, H/O cardiovascular stress test, Hepatitis, HIV (human immunodeficiency virus infection) ([facility] [address] Hyperlipidemia, Hypertension, MS (multiple sclerosis) ([facility] [address] Respiratory disease, Sleep apnea, and Sleep apnea. Past Surgical History:  has a past surgical history that includes sinus surgery; Hysterectomy; Tonsillectomy; Colonoscopy; and Tonsillectomy. Social History:  reports that she has never smoked. She has never used smokeless tobacco. She reports that she does not drink alcohol and does not use drugs. Family History: family history includes Cancer in her sister; High Cholesterol in her father and mother; Hypertension in her mother; Osteoarthritis in her father, mother, and sister. The patients home medications have been reviewed.     Allergies: Amoxicillin, Augmentin [amoxicillin-pot clavulanate], Clavulanic acid, and Chlorphen-pe-acetaminophen        ---------------------------------------------------PHYSICAL EXAM--------------------------------------    Constitutional/General: Alert and oriented x3, well appearing, non toxic in NAD  Head: Normocephalic and atraumatic  Eyes: PERRL, EOMI  Mouth: Oropharynx clear, handling secretions,  Neck: Supple, full ROM  Pulmonary: Lungs clear to auscultation bilaterally, no wheezes, rales, or rhonchi. Not in respiratory distress  Cardiovascular:  Regular rate. Regular rhythm. No murmurs, gallops, or rubs. 2+ distal pulses  Chest: no chest wall tenderness  Abdomen: Soft. Non tender. Non distended. +BS. No rebound, guarding, or rigidity. No pulsatile masses appreciated. Musculoskeletal: Moves all extremities x 4. Warm and well perfused. Capillary refill <3 seconds  Skin: warm and dry. No rashes. Neurologic: GCS 15  Psych: Normal Affect  Pelvic exam: exam declined by the patient.     -------------------------------------------------- RESULTS -------------------------------------------------  I have personally reviewed all laboratory and imaging results for this patient. Results are listed below.      LABS:  Results for orders placed or performed during the hospital encounter of 01/07/23   C.trachomatis N.gonorrhoeae DNA, Urine    Specimen: Urine   Result Value Ref Range    Source Urine    Urinalysis   Result Value Ref Range    Color, UA Yellow Straw/Yellow    Clarity, UA Clear Clear    Glucose, Ur Negative Negative mg/dL    Bilirubin Urine Negative Negative    Ketones, Urine Negative Negative mg/dL    Specific Gravity, UA >=1.030 1.005 - 1.030    Blood, Urine SMALL (A) Negative    pH, UA 5.0 5.0 - 9.0    Protein, UA Negative Negative mg/dL    Urobilinogen, Urine 0.2 <2.0 E.U./dL    Nitrite, Urine Negative Negative    Leukocyte Esterase, Urine Negative Negative   Microscopic Urinalysis   Result Value Ref Range    WBC, UA 0-1 0 - 5 /HPF    RBC, UA 0-1 0 - 2 /HPF    Epithelial Cells, UA FEW /HPF    Bacteria, UA FEW (A) None Seen /HPF       RADIOLOGY:  Interpreted by Radiologist.  No orders to display         ------------------------- NURSING NOTES AND VITALS REVIEWED ---------------------------   The nursing notes within the ED encounter and vital signs as below have been reviewed by myself. BP (!) 137/91   Pulse 81   Temp 98.6 °F (37 °C)   Resp 20   Wt 250 lb (113.4 kg)   SpO2 98%   BMI 39.16 kg/m²   Oxygen Saturation Interpretation: Normal    The patients available past medical records and past encounters were reviewed. ------------------------------ ED COURSE/MEDICAL DECISION MAKING----------------------  Medications   cefTRIAXone (ROCEPHIN) injection 500 mg (500 mg IntraMUSCular Given 1/7/23 1746)   azithromycin (ZITHROMAX) tablet 1,000 mg (1,000 mg Oral Given 1/7/23 1747)   lidocaine 1 % injection 5 mL (5 mLs IntraDERmal Given 1/7/23 1749)             Medical Decision Making:    Patient is a 59-year-old female presenting to urgent care today with request for testing and treatment of venereal diseases. Patient refuses to elaborate further on what exactly she is wanting tested for. Does have vaginal itching but denies any vaginal pain, vaginal discharge, genital rashes lesions or masses. Declines physical exam today. Did advise that we can test for gonorrhea and chlamydia and prophylactically treat today. Did discuss that we are unable to test for other STIs such as syphilis, HIV and hepatitis. She will need to follow-up with her PCP for further testing of this. She is nontoxic-appearing, afebrile, no acute distress. Gonorrhea chlamydia testing ordered. Prophylactically treated with 500 mg IM Rocephin and 1000 mg p.o. azithromycin. Advised follow-up with PCP for recheck return the emergency department any new or worsening symptoms. Patient voiced understanding and is agreeable to the above treatment plan. Counseling:    The emergency provider has spoken with the patient and discussed todays results, in addition to providing specific details for the plan of care and counseling regarding the diagnosis and prognosis. Questions are answered at this time and they are agreeable with the plan.       --------------------------------- IMPRESSION AND DISPOSITION ---------------------------------    IMPRESSION  1.  Possible exposure to STD        DISPOSITION  Disposition: Discharge to home  Patient condition is stable                Carlos Mitchell, [place]  01/07/23 [id number]

## 2023-01-11 LAB
C. TRACHOMATIS DNA ,URINE: NEGATIVE
N. GONORRHOEAE DNA, URINE: NEGATIVE
SOURCE: NORMAL

## 2023-02-08 ENCOUNTER — HOSPITAL ENCOUNTER (EMERGENCY)
Age: 63
Discharge: HOME HEALTH | End: 2023-02-08
Payer: COMMERCIAL

## 2023-02-08 ENCOUNTER — HOSPITAL ENCOUNTER (OUTPATIENT)
Dept: GENERAL RADIOLOGY | Age: 63
Discharge: HOME | End: 2023-02-10
Payer: COMMERCIAL

## 2023-02-08 ENCOUNTER — HOSPITAL ENCOUNTER (OUTPATIENT)
Age: 63
Discharge: HOME | End: 2023-02-10
Payer: COMMERCIAL

## 2023-02-08 VITALS
OXYGEN SATURATION: 97 % | HEIGHT: 67 IN | BODY MASS INDEX: 39.24 KG/M2 | SYSTOLIC BLOOD PRESSURE: 149 MMHG | TEMPERATURE: 97.9 F | DIASTOLIC BLOOD PRESSURE: 77 MMHG | WEIGHT: 250 LBS | RESPIRATION RATE: 18 BRPM | HEART RATE: 77 BPM

## 2023-02-08 DIAGNOSIS — R52 PAIN: ICD-10-CM

## 2023-02-08 DIAGNOSIS — M25.531 WRIST PAIN, ACUTE, RIGHT: Primary | ICD-10-CM

## 2023-02-08 PROCEDURE — 73130 X-RAY EXAM OF HAND: CPT

## 2023-02-08 PROCEDURE — 99211 OFF/OP EST MAY X REQ PHY/QHP: CPT

## 2023-02-08 PROCEDURE — 6370000000 HC RX 637 (ALT 250 FOR IP): Performed by: PHYSICIAN ASSISTANT

## 2023-02-08 PROCEDURE — 73110 X-RAY EXAM OF WRIST: CPT

## 2023-02-08 RX ORDER — PREDNISONE 10 MG/1
TABLET ORAL
Qty: 30 TABLET | Refills: 0 | Status: SHIPPED | OUTPATIENT
Start: 2023-02-08 | End: 2023-02-18

## 2023-02-08 RX ORDER — PREDNISONE 20 MG/1
60 TABLET ORAL ONCE
Status: COMPLETED | OUTPATIENT
Start: 2023-02-08 | End: 2023-02-08

## 2023-02-08 RX ADMIN — PREDNISONE 60 MG: 20 TABLET ORAL at 17:39

## 2023-02-08 ASSESSMENT — PAIN DESCRIPTION - LOCATION: LOCATION: WRIST

## 2023-02-08 ASSESSMENT — PAIN DESCRIPTION - ONSET: ONSET: ON-GOING

## 2023-02-08 ASSESSMENT — PAIN DESCRIPTION - FREQUENCY: FREQUENCY: CONTINUOUS

## 2023-02-08 ASSESSMENT — PAIN - FUNCTIONAL ASSESSMENT: PAIN_FUNCTIONAL_ASSESSMENT: 0-10

## 2023-02-08 ASSESSMENT — PAIN DESCRIPTION - DESCRIPTORS: DESCRIPTORS: ACHING;SORE

## 2023-02-08 ASSESSMENT — PAIN DESCRIPTION - PAIN TYPE: TYPE: ACUTE PAIN

## 2023-02-08 ASSESSMENT — PAIN DESCRIPTION - ORIENTATION: ORIENTATION: RIGHT

## 2023-02-08 ASSESSMENT — PAIN SCALES - GENERAL: PAINLEVEL_OUTOF10: 9

## 2023-02-08 NOTE — ED PROVIDER NOTES
HPI:  2/8/23, Time: 5:24 PM RANDY Merida is a 60 y.o. female presenting to the ED for right wrist pain, beginning 2 days ago. The complaint has been persistent, moderate in severity, and worsened by movement of right wrist.  Patient does not recall any injury prior to the onset of the symptoms. Does report history of arthritis as well as carpal tunnel in the right wrist with similar symptoms in the past.  Reports that it is slightly swollen but denies any redness or warmth to the area. She has not taken any medication prior to arrival  to help with her symptoms. Afebrile without recent travel or sick contacts. Patient denies other symptoms at this time. Review of Systems:   A complete review of systems was performed and pertinent positives and negatives are stated within HPI, all other systems reviewed and are negative.          --------------------------------------------- PAST HISTORY ---------------------------------------------  Past Medical History:  has a past medical history of Arthritis, Asthma, COPD [facility]chronic obstructive [facility] disease) [facility] Depressed, Diabetes mellitus ([facility] Fibromyalgia, H/O cardiovascular stress test, Hepatitis, HIV (human immunodeficiency virus infection) ([facility] Hyperlipidemia, Hypertension, MS (multiple sclerosis) ([facility] Respiratory disease, Sleep apnea, and Sleep apnea. Past Surgical History:  has a past surgical history that includes sinus surgery; Hysterectomy; Tonsillectomy; Colonoscopy; and Tonsillectomy. Social History:  reports that she has never smoked. She has never used smokeless tobacco. She reports that she does not drink alcohol and does not use drugs. Family History: family history includes Cancer in her sister; High Cholesterol in her father and mother; Hypertension in her mother; Osteoarthritis in her father, mother, and sister. The patients home medications have been reviewed.     Allergies: Amoxicillin, Augmentin [amoxicillin-pot clavulanate], Clavulanic acid, Venlafaxine, and Chlorphen-pe-acetaminophen    -------------------------------------------------- RESULTS -------------------------------------------------  All laboratory and radiology results have been personally reviewed by myself   LABS:  No results found for this visit on 02/08/23. RADIOLOGY:  Interpreted by Radiologist.  No orders to display       ------------------------- NURSING NOTES AND VITALS REVIEWED ---------------------------   The nursing notes within the ED encounter and vital signs as below have been reviewed. BP (!) 149/77   Pulse 77   Temp 97.9 °F (36.6 °C) (Infrared)   Resp 18   Ht 5' 7\" (1.702 m)   Wt 250 lb (113.4 kg)   SpO2 97%   BMI 39.16 kg/m²   Oxygen Saturation Interpretation: Normal      ---------------------------------------------------PHYSICAL EXAM--------------------------------------      Constitutional/General: Alert and oriented x3, well appearing, non toxic in NAD  Head: Normocephalic and atraumatic  Eyes: PERRL, EOMI  Mouth: Oropharynx clear, handling secretions, no trismus  Neck: Supple, full ROM,   Extremities: Moves all extremities x 4. Warm and well perfused. Diffuse tenderness to palpation about the right wrist without any swelling or erythema present. Slightly limited range of motion of the right wrist secondary to pain. Full range of motion of all digits of the right hand. Compartments of the right upper extremity soft and nontender. No particular tenderness to palpation of the scaphoid on the right. Skin: warm and dry without rash  Neurologic: GCS 15,  Psych: Normal Affect      ------------------------------ ED COURSE/MEDICAL DECISION MAKING----------------------  Medications   predniSONE (DELTASONE) tablet 60 mg (60 mg Oral Given 2/8/23 1739)         ED COURSE:       Medical Decision Making:    Patient is a 69-year-old female presenting to urgent care today with right wrist pain for the last 2 to 3 days. No injury prior to the onset of symptoms or for imaging studies were not done. Does have a history of carpal tunnel as well as arthritis of the right wrist.  At this time she is nontoxic-appearing, afebrile, no acute distress. No signs or symptoms of compartment syndrome or septic joint. At this time we will plan on outpatient management with a prednisone taper. Advised to follow very closely with PCP for recheck return emergency department with any new or worsening symptoms. Patient voiced understanding and is agreeable to the above treatment plan. Counseling: The emergency provider has spoken with the patient and discussed todays results, in addition to providing specific details for the plan of care and counseling regarding the diagnosis and prognosis. Questions are answered at this time and they are agreeable with the plan.      --------------------------------- IMPRESSION AND DISPOSITION ---------------------------------    IMPRESSION  1. Wrist pain, acute, right        DISPOSITION  Disposition: Discharge to home  Patient condition is stable      NOTE: This report was transcribed using voice recognition software.  Every effort was made to ensure accuracy; however, inadvertent computerized transcription errors may be present        Delma Euceda  02/08/23 [id number]

## 2023-03-15 ENCOUNTER — HOSPITAL ENCOUNTER (EMERGENCY)
Age: 63
Discharge: HOME | End: 2023-03-15
Payer: COMMERCIAL

## 2023-03-15 VITALS
HEIGHT: 66 IN | DIASTOLIC BLOOD PRESSURE: 78 MMHG | BODY MASS INDEX: 40.18 KG/M2 | HEART RATE: 90 BPM | RESPIRATION RATE: 18 BRPM | SYSTOLIC BLOOD PRESSURE: 153 MMHG | OXYGEN SATURATION: 99 % | WEIGHT: 250 LBS | TEMPERATURE: 98.6 F

## 2023-03-15 DIAGNOSIS — M25.50 ARTHRALGIA, UNSPECIFIED JOINT: Primary | ICD-10-CM

## 2023-03-15 PROCEDURE — 6360000002 HC RX W HCPCS: Performed by: NURSE PRACTITIONER

## 2023-03-15 PROCEDURE — 99211 OFF/OP EST MAY X REQ PHY/QHP: CPT

## 2023-03-15 PROCEDURE — 96372 THER/PROPH/DIAG INJ SC/IM: CPT

## 2023-03-15 RX ORDER — DEXAMETHASONE SODIUM PHOSPHATE 10 MG/ML
10 INJECTION, SOLUTION INTRAMUSCULAR; INTRAVENOUS ONCE
Status: COMPLETED | OUTPATIENT
Start: 2023-03-15 | End: 2023-03-15

## 2023-03-15 RX ORDER — KETOROLAC TROMETHAMINE 30 MG/ML
30 INJECTION, SOLUTION INTRAMUSCULAR; INTRAVENOUS ONCE
Status: COMPLETED | OUTPATIENT
Start: 2023-03-15 | End: 2023-03-15

## 2023-03-15 RX ADMIN — KETOROLAC TROMETHAMINE 30 MG: 30 INJECTION, SOLUTION INTRAMUSCULAR at 17:29

## 2023-03-15 RX ADMIN — DEXAMETHASONE SODIUM PHOSPHATE 10 MG: 10 INJECTION INTRAMUSCULAR; INTRAVENOUS at 17:29

## 2023-03-15 ASSESSMENT — PAIN - FUNCTIONAL ASSESSMENT: PAIN_FUNCTIONAL_ASSESSMENT: 0-10

## 2023-03-15 ASSESSMENT — PAIN SCALES - GENERAL: PAINLEVEL_OUTOF10: 0

## 2023-03-15 NOTE — ED PROVIDER NOTES
[address] ENCOUNTER        Pt Name: Arelis Costa  MRN: [id number]  [place] 1960  Date of evaluation: 3/15/2023  Provider: HERBERT eMlara - CNP  PCP: Juancarlos Lyn RN  Note Started: 7:04 PM EDT 3/15/23    CHIEF COMPLAINT       Chief Complaint   Patient presents with    Other     States she is here for her shot for body aches        HISTORY OF PRESENT ILLNESS: 1 or more Elements   History From: patient    Limitations to history : None    Arelis Costa is a 60 y.o. female who presents fore evaluation. He has joint pain pain in her joints in her wrist and her knees and her hips generalized joint pain she said she usually comes in and gets a shot for it and also her wheezing from her COPD. She said that she gets Toradol for her joint pain and had a steroid for her breathing. Nursing Notes were all reviewed and agreed with or any disagreements were addressed in the HPI. REVIEW OF SYSTEMS :      Review of Systems    Positives and Pertinent negatives as per HPI. SURGICAL HISTORY     Past Surgical History:   Procedure Laterality Date    COLONOSCOPY      HYSTERECTOMY (CERVIX STATUS UNKNOWN)      SINUS SURGERY      TONSILLECTOMY      TONSILLECTOMY         CURRENTMEDICATIONS       Discharge Medication List as of 3/15/2023  5:27 PM        CONTINUE these medications which have NOT CHANGED    Details   benzonatate (TESSALON) 200 MG capsule Take 1 capsule by mouth 3 times daily as needed for Cough, Disp-15 capsule, R-0Normal      predniSONE (DELTASONE) 20 MG tablet Take 2 tablets by mouth daily x 4 days. , Disp-8 tablet, R-0Normal      oxybutynin (DITROPAN XL) 5 MG extended release tablet Take 1 tablet by mouth daily, Disp-20 tablet, R-0Normal      Magnesium Citrate 1.745 GM/30ML solution Drink one half of the contents of bottle on day 1.   If no results drink the rest of the contents on day 2., Disp-300 mL, R-0Print      dicyclomine (BENTYL) 20 MG tablet Take 1 tablet by mouth every 6 hours as needed (abdominal cramping), Disp-12 tablet, R-0Print      albuterol sulfate HFA (PROVENTIL HFA) 108 (90 Base) MCG/ACT inhaler Inhale 2 puffs into the lungs 4 times daily as needed for Wheezing, Disp-1 each, R-0Normal      tiZANidine (ZANAFLEX) 4 MG tablet Take 1 tablet by mouth 2 times daily as needed (pain/spasm (may cause drowsiness)), Disp-12 tablet, R-0Normal      promethazine (PHENERGAN) 25 MG tablet Take 1 tablet by mouth every 8 hours as needed for Nausea (may cause drowsiness), Disp-6 tablet, R-0Normal      ibuprofen (ADVIL;MOTRIN) 600 MG tablet Take 1 tablet by mouth every 6 hours as needed for Pain, Disp-20 tablet, R-0Normal      SPIRONOLACTONE PO Take by mouthHistorical Med      ESTROGENS CONJUGATED PO Take by mouthHistorical Med      senna (SENOKOT) 8.6 MG TABS tablet Take 2 tablets by mouth daily as needed for Constipation, Disp-12 tablet, R-0Print      ipratropium-albuterol (DUONEB) 0.5-2.5 (3) MG/3ML SOLN nebulizer solution Take 3 mLs by nebulization every 6 hours as needed for Shortness of Breath, Disp-90 mL,R-0Print      aspirin 81 MG chewable tablet Take 1 tablet by mouth daily, Disp-30 tablet, R-0Print      mometasone-formoterol (DULERA) 100-5 MCG/ACT inhaler Inhale 2 puffs into the lungs 2 times daily, Disp-1 Inhaler, R-0Print      Respiratory Therapy Supplies (FULL KIT NEBULIZER SET) MISC Disp-1 each, R-0, PrintUse as directed with nebulized medication. losartan (COZAAR) 100 MG tablet Take 1 tablet by mouth daily for 7 days, Disp-7 tablet, R-0Print      metFORMIN (GLUCOPHAGE) 500 MG tablet Take 1 tablet by mouth 2 times daily (with meals) for 7 days, Disp-14 tablet, R-0Print      gabapentin (NEURONTIN) 800 MG tablet Take 1 tablet by mouth 2 times daily for 7 days. , Disp-14 tablet, R-0Print             ALLERGIES     Amoxicillin, Augmentin [amoxicillin-pot clavulanate], Clavulanic acid, Venlafaxine, and Chlorphen-pe-acetaminophen    FAMILYHISTORY       Family History   Problem Relation Age of Onset    Osteoarthritis Mother     High Cholesterol Mother     Hypertension Mother     Osteoarthritis Father     High Cholesterol Father     Osteoarthritis Sister     Cancer Sister         SOCIAL HISTORY       Social History     Tobacco Use    Smoking status: Never    Smokeless tobacco: Never   Vaping Use    Vaping Use: Never used   Substance Use Topics    Alcohol use: No    Drug use: No     Comment: only when young       SCREENINGS                         CIWA Assessment  BP: (!) 153/78  Heart Rate: 90           PHYSICAL EXAM  1 or more Elements     ED Triage Vitals [03/15/23 1706]   BP Temp Temp src Heart Rate Resp SpO2 Height Weight   (!) 153/78 98.6 °F (37 °C) -- 90 18 99 % 5' 6\" (1.676 m) 250 lb (113.4 kg)       Physical Exam        Constitutional/General: Alert and oriented x3, no distress, appears well  Head: Normocephalic and atraumatic  Eyes:  conjunctiva normal, sclera non icteric  ENT: , handling secretions,  Neck: Supple, full ROM,  Respiratory: Lungs -- wheezing throughout,  Not in respiratory distress  Cardiovascular:  Regular rate. Regular rhythm. Musculoskeletal: Moves all extremities x 4.erythema or abnormal warmth over the wrists or hands. She said that she just has pain in all of her joints. With movementi  ntegument: skin warm and dry. No rashes. Neurologic: GCS 15, no focal deficits, symmetric strength 5/5 in the upper and lower extremities bilaterally  Psychiatric: Normal Affect            DIAGNOSTIC RESULTS   LABS:    Labs Reviewed - No data to display    As interpreted by me, the above displayed labs are abnormal. All other labs obtained during this visit were within normal range or not returned as of this dictation.             RADIOLOGY:   Non-plain film images such as CT, Ultrasound and MRI are read by the radiologist. Plain radiographic images are visualized and preliminarily interpreted by the ED Provider with the below findings:        Interpretation per the Radiologist below, if available at the time of this note:    No orders to display     No results found. No results found. PROCEDURES   Unless otherwise noted below, none     Procedures      PAST MEDICAL HISTORY/Chronic Conditions Affecting Care      has a past medical history of Arthritis, Asthma, COPD (chronic obstructive pulmonary disease) ([facility] [address] Depressed, Diabetes mellitus ([facility] [address] Fibromyalgia, H/O cardiovascular stress test (08/19/2021), Hepatitis, [facility] Hyperlipidemia, Hypertension, MS (multiple sclerosis) ([facility] [address] Respiratory disease, Sleep apnea, and Sleep apnea. EMERGENCY DEPARTMENT COURSE    Vitals:    Vitals:    03/15/23 1706   BP: (!) 153/78   Pulse: 90   Resp: 18   Temp: 98.6 °F (37 °C)   SpO2: 99%   Weight: 250 lb (113.4 kg)   Height: 5' 6\" (1.676 m)       Patient was given the following medications:  Medications   ketorolac (TORADOL) injection 30 mg (30 mg IntraMUSCular Given 3/15/23 1729)   dexamethasone (DECADRON) Oral 10 mg (10 mg Oral Given 3/15/23 1729)                   Medical Decision Making/Differential Diagnosis:    CC/HPI Summary, Social Determinants of health, Records Reviewed, DDx, testing done/not done, ED Course, Reassessment, disposition considerations/shared decision making with patient, consults, disposition:        No falls were no injury she does not need any x-rays. She has chronic pain in her joints. She was given Toradol IM for the joint pain and also for her wheezing she was given some Decadron advised to follow-up with her doctor she does breathing treatments at home. CONSULTS: (Who and What was discussed)  None        I am the Primary Clinician of Record. FINAL IMPRESSION      1.  Arthralgia, unspecified joint          DISPOSITION/PLAN     DISPOSITION Decision To Discharge 03/15/2023 05:34:08 PM      PATIENT REFERRED TO:  follow up with your     Schedule an appointment as soon as possible for a visit         DISCHARGE MEDICATIONS:  Discharge Medication List as of 3/15/2023  5:27 PM          DISCONTINUED MEDICATIONS:  Discharge Medication List as of 3/15/2023  5:27 PM                 (Please note that portions of this note were completed with a voice recognition program.  Efforts were made to edit the dictations but occasionally words are mis-transcribed.)    HERBERT Porter CNP (electronically signed)          HERBERT Porter CNP  03/15/23 1916

## 2023-03-30 ENCOUNTER — HOSPITAL ENCOUNTER (EMERGENCY)
Age: 63
Discharge: LEFT BEFORE DISCHARGE | End: 2023-03-30
Attending: EMERGENCY MEDICINE
Payer: COMMERCIAL

## 2023-03-30 ENCOUNTER — HOSPITAL ENCOUNTER (EMERGENCY)
Age: 63
Discharge: HOME | End: 2023-03-30
Payer: COMMERCIAL

## 2023-03-30 VITALS
HEART RATE: 92 BPM | OXYGEN SATURATION: 97 % | DIASTOLIC BLOOD PRESSURE: 81 MMHG | RESPIRATION RATE: 18 BRPM | SYSTOLIC BLOOD PRESSURE: 137 MMHG | TEMPERATURE: 97.7 F

## 2023-03-30 VITALS
WEIGHT: 250 LBS | BODY MASS INDEX: 40.35 KG/M2 | OXYGEN SATURATION: 97 % | HEART RATE: 88 BPM | DIASTOLIC BLOOD PRESSURE: 77 MMHG | SYSTOLIC BLOOD PRESSURE: 119 MMHG | TEMPERATURE: 98.1 F | RESPIRATION RATE: 18 BRPM

## 2023-03-30 DIAGNOSIS — R10.9 ABDOMINAL PAIN, UNSPECIFIED ABDOMINAL LOCATION: Primary | ICD-10-CM

## 2023-03-30 DIAGNOSIS — R19.7 DIARRHEA, UNSPECIFIED TYPE: ICD-10-CM

## 2023-03-30 DIAGNOSIS — Z53.29 LEFT AGAINST MEDICAL ADVICE: Primary | ICD-10-CM

## 2023-03-30 PROCEDURE — 99211 OFF/OP EST MAY X REQ PHY/QHP: CPT

## 2023-03-30 PROCEDURE — 99281 EMR DPT VST MAYX REQ PHY/QHP: CPT

## 2023-03-30 RX ORDER — 0.9 % SODIUM CHLORIDE 0.9 %
1000 INTRAVENOUS SOLUTION INTRAVENOUS ONCE
Status: DISCONTINUED | OUTPATIENT
Start: 2023-03-30 | End: 2023-03-30 | Stop reason: HOSPADM

## 2023-03-30 ASSESSMENT — PAIN SCALES - GENERAL
PAINLEVEL_OUTOF10: 5
PAINLEVEL_OUTOF10: 7

## 2023-03-30 ASSESSMENT — ENCOUNTER SYMPTOMS
SHORTNESS OF BREATH: 0
WHEEZING: 0
VOMITING: 0
EYE DISCHARGE: 0
DIARRHEA: 1
COUGH: 0
BACK PAIN: 0
NAUSEA: 0
SORE THROAT: 0
EYE PAIN: 0
ABDOMINAL DISTENTION: 1
EYE REDNESS: 0
SINUS PRESSURE: 0

## 2023-03-30 ASSESSMENT — PAIN - FUNCTIONAL ASSESSMENT
PAIN_FUNCTIONAL_ASSESSMENT: 0-10
PAIN_FUNCTIONAL_ASSESSMENT: 0-10

## 2023-03-30 ASSESSMENT — PAIN DESCRIPTION - LOCATION
LOCATION: ABDOMEN
LOCATION: ABDOMEN

## 2023-03-30 ASSESSMENT — PAIN DESCRIPTION - DESCRIPTORS: DESCRIPTORS: ACHING;CRAMPING

## 2023-03-30 NOTE — ED PROVIDER NOTES
Efforts were made to edit the dictations but occasionally words are mis-transcribed. )    --------------------------------- IMPRESSION AND DISPOSITION ---------------------------------    IMPRESSION  1. Abdominal pain, unspecified abdominal location    2. Diarrhea, unspecified type        DISPOSITION      Disposition: Discharge to ED by car  Patient condition is stable. I am the Primary Clinician of Record.      Nikhil Qureshi PA-C (electronically signed) on 3/30/2023 at [address] [facility], [place]  03/30/23 5507

## 2023-03-31 ENCOUNTER — HOSPITAL ENCOUNTER (EMERGENCY)
Age: 63
Discharge: HOME | End: 2023-03-31
Attending: EMERGENCY MEDICINE
Payer: COMMERCIAL

## 2023-03-31 VITALS — OXYGEN SATURATION: 100 % | TEMPERATURE: 98.3 F | BODY MASS INDEX: 41.97 KG/M2 | WEIGHT: 260 LBS | HEART RATE: 98 BPM

## 2023-03-31 DIAGNOSIS — R19.7 DIARRHEA, UNSPECIFIED TYPE: Primary | ICD-10-CM

## 2023-03-31 LAB
CHP ED QC CHECK: NORMAL
GLUCOSE BLD-MCNC: 121 MG/DL
METER GLUCOSE: 121 MG/DL (ref 74–99)

## 2023-03-31 PROCEDURE — 99282 EMERGENCY DEPT VISIT SF MDM: CPT

## 2023-03-31 PROCEDURE — 82962 GLUCOSE BLOOD TEST: CPT

## 2023-03-31 NOTE — ED PROVIDER NOTES
Mother     High Cholesterol Mother     Hypertension Mother     Osteoarthritis Father     High Cholesterol Father     Osteoarthritis Sister     Cancer Sister         SOCIAL HISTORY       Social History     Tobacco Use    Smoking status: Never    Smokeless tobacco: Never   Vaping Use    Vaping Use: Never used   Substance Use Topics    Alcohol use: No    Drug use: No     Comment: only when young       SCREENINGS        [place] Coma Scale  Eye Opening: Spontaneous  Best Verbal Response: Oriented  Best Motor Response: Obeys commands  [place] Coma Scale Score: 15                CIWA Assessment  Heart Rate: 98           PHYSICAL EXAM  1 or more Elements     ED Triage Vitals   BP Temp Temp src Heart Rate Resp SpO2 Height Weight   -- 03/31/23 1835 -- 03/31/23 1835 -- 03/31/23 1835 -- 03/31/23 1846    98.3 °F (36.8 °C)  98  100 %  260 lb (117.9 kg)       Physical Exam  Constitutional:       General: She is not in acute distress. Appearance: She is obese. She is not ill-appearing. HENT:      Head: Normocephalic. Cardiovascular:      Rate and Rhythm: Normal rate and regular rhythm. Pulmonary:      Effort: Pulmonary effort is normal. No respiratory distress. Breath sounds: No wheezing, rhonchi or rales. Abdominal:      General: Bowel sounds are normal. There is no distension. Tenderness: There is no abdominal tenderness. Neurological:      Mental Status: She is alert.          DIAGNOSTIC RESULTS   LABS:    Labs Reviewed   POCT GLUCOSE - Abnormal; Notable for the following components:       Result Value    Meter Glucose 121 (*)     All other components within normal limits   POCT GLUCOSE - Normal         PROCEDURES   Unless otherwise noted below, none     Procedures      PAST MEDICAL HISTORY      has a past medical history of Arthritis, Asthma, COPD (chronic obstructive pulmonary disease) ([facility] [address] Depressed, Diabetes mellitus ([facility] [address] Fibromyalgia, H/O cardiovascular stress test (08/19/2021), Hepatitis, HIV (electronically signed)           Aquilino Redman DO  Resident  03/31/23 2007

## 2023-03-31 NOTE — ED NOTES
Patient is refusing blood work and IV for fluids. Dr Aida Tsang made aware.      [facility]  03/30/23 2111

## 2023-03-31 NOTE — ED PROVIDER NOTES
Patient is a 61 y/o female who presents to the ED with diarrhea. Patient states that she ate chocolate cake four days ago and believes that she has food poisoning. She states that she vomited after eating the cake and has since had diarrhea. She states that she took an Imodium and the diarrhea has slowed down. She went to Urgent Care today and was sent here for further evaluation. She denies any nausea. She denies any abdominal pain. She denies any fever. Review of Systems   Constitutional:  Negative for chills and fever. HENT:  Negative for ear pain, sinus pressure and sore throat. Eyes:  Negative for pain, discharge and redness. Respiratory:  Negative for cough, shortness of breath and wheezing. Cardiovascular:  Negative for chest pain. Gastrointestinal:  Positive for abdominal distention and diarrhea. Negative for nausea and vomiting. Genitourinary:  Negative for dysuria and frequency. Musculoskeletal:  Negative for arthralgias and back pain. Skin:  Negative for rash and wound. Neurological:  Negative for weakness and headaches. Hematological:  Negative for adenopathy. All other systems reviewed and are negative. Physical Exam  Vitals and nursing note reviewed. Constitutional:       General: She is not in acute distress. HENT:      Head: Normocephalic and atraumatic. Mouth/Throat:      Mouth: Mucous membranes are moist.   Eyes:      Pupils: Pupils are equal, round, and reactive to light. Cardiovascular:      Rate and Rhythm: Normal rate and regular rhythm. Heart sounds: No murmur heard. Pulmonary:      Effort: Pulmonary effort is normal. No respiratory distress. Breath sounds: Normal breath sounds. No stridor. No wheezing, rhonchi or rales. Abdominal:      General: Abdomen is flat. Bowel sounds are normal.      Palpations: Abdomen is soft. Tenderness: There is no abdominal tenderness. There is no guarding.  Negative signs include Guadalupe's sign and instructed. They have been advised that they should return to the ED immediately if they change their mind at any time, or if their condition begins to change or worsen.                    Paulina Melo DO  03/30/23 2129

## 2023-04-01 NOTE — ED NOTES
Discussed with the patient and all questioned fully answered. She will call me if any problems arise.        Wojciech Early RN  03/31/23 2022

## 2023-04-01 NOTE — ED NOTES
Discussed with the patient and all questioned fully answered. She will call me if any problems arise.        Brit Santos RN  03/31/23 2021

## 2023-04-16 ENCOUNTER — HOSPITAL ENCOUNTER (EMERGENCY)
Age: 63
Discharge: HOME | End: 2023-04-16
Attending: EMERGENCY MEDICINE
Payer: COMMERCIAL

## 2023-04-16 ENCOUNTER — APPOINTMENT (OUTPATIENT)
Dept: GENERAL RADIOLOGY | Age: 63
End: 2023-04-16
Payer: COMMERCIAL

## 2023-04-16 VITALS
BODY MASS INDEX: 41.97 KG/M2 | WEIGHT: 260 LBS | OXYGEN SATURATION: 97 % | HEART RATE: 85 BPM | TEMPERATURE: 98.2 F | SYSTOLIC BLOOD PRESSURE: 146 MMHG | DIASTOLIC BLOOD PRESSURE: 84 MMHG | RESPIRATION RATE: 16 BRPM

## 2023-04-16 DIAGNOSIS — J20.9 ACUTE BRONCHITIS, UNSPECIFIED ORGANISM: ICD-10-CM

## 2023-04-16 DIAGNOSIS — R03.0 ELEVATED BLOOD PRESSURE READING: ICD-10-CM

## 2023-04-16 DIAGNOSIS — J01.00 ACUTE MAXILLARY SINUSITIS, RECURRENCE NOT SPECIFIED: Primary | ICD-10-CM

## 2023-04-16 LAB
INFLUENZA A: NOT DETECTED
INFLUENZA B: NOT DETECTED
SARS-COV-2 RNA, RT PCR: NOT DETECTED

## 2023-04-16 PROCEDURE — 99284 EMERGENCY DEPT VISIT MOD MDM: CPT

## 2023-04-16 PROCEDURE — 71046 X-RAY EXAM CHEST 2 VIEWS: CPT

## 2023-04-16 PROCEDURE — 6370000000 HC RX 637 (ALT 250 FOR IP): Performed by: EMERGENCY MEDICINE

## 2023-04-16 PROCEDURE — 87636 SARSCOV2 & INF A&B AMP PRB: CPT

## 2023-04-16 RX ORDER — DOXYCYCLINE HYCLATE 100 MG/1
100 CAPSULE ORAL ONCE
Status: COMPLETED | OUTPATIENT
Start: 2023-04-16 | End: 2023-04-16

## 2023-04-16 RX ORDER — LORATADINE 10 MG
1 CAPSULE ORAL 2 TIMES DAILY PRN
Qty: 30 CAPSULE | Refills: 0 | Status: SHIPPED | OUTPATIENT
Start: 2023-04-16

## 2023-04-16 RX ORDER — ALBUTEROL SULFATE 90 UG/1
2 AEROSOL, METERED RESPIRATORY (INHALATION) 4 TIMES DAILY PRN
Qty: 1 EACH | Refills: 0 | Status: SHIPPED | OUTPATIENT
Start: 2023-04-16 | End: 2024-04-15

## 2023-04-16 RX ORDER — GUAIFENESIN/DEXTROMETHORPHAN 100-10MG/5
10 SYRUP ORAL ONCE
Status: COMPLETED | OUTPATIENT
Start: 2023-04-16 | End: 2023-04-16

## 2023-04-16 RX ORDER — DOXYCYCLINE HYCLATE 100 MG
100 TABLET ORAL 2 TIMES DAILY
Qty: 20 TABLET | Refills: 0 | Status: SHIPPED | OUTPATIENT
Start: 2023-04-16 | End: 2023-04-26

## 2023-04-16 RX ORDER — ACETAMINOPHEN 500 MG
1000 TABLET ORAL ONCE
Status: COMPLETED | OUTPATIENT
Start: 2023-04-16 | End: 2023-04-16

## 2023-04-16 RX ADMIN — GUAIFENESIN SYRUP AND DEXTROMETHORPHAN 10 ML: 100; 10 SYRUP ORAL at 18:33

## 2023-04-16 RX ADMIN — DOXYCYCLINE HYCLATE 100 MG: 100 CAPSULE ORAL at 19:22

## 2023-04-16 RX ADMIN — ACETAMINOPHEN 1000 MG: 500 TABLET ORAL at 18:33

## 2023-04-16 ASSESSMENT — LIFESTYLE VARIABLES
HOW MANY STANDARD DRINKS CONTAINING ALCOHOL DO YOU HAVE ON A TYPICAL DAY: PATIENT DOES NOT DRINK
HOW OFTEN DO YOU HAVE A DRINK CONTAINING ALCOHOL: NEVER

## 2023-04-16 ASSESSMENT — PAIN - FUNCTIONAL ASSESSMENT: PAIN_FUNCTIONAL_ASSESSMENT: NONE - DENIES PAIN

## 2023-05-27 ENCOUNTER — HOSPITAL ENCOUNTER (EMERGENCY)
Age: 63
Discharge: HOME | End: 2023-05-27
Payer: COMMERCIAL

## 2023-05-27 VITALS
HEIGHT: 67 IN | BODY MASS INDEX: 39.24 KG/M2 | DIASTOLIC BLOOD PRESSURE: 82 MMHG | OXYGEN SATURATION: 99 % | RESPIRATION RATE: 20 BRPM | TEMPERATURE: 98.2 F | WEIGHT: 250 LBS | HEART RATE: 90 BPM | SYSTOLIC BLOOD PRESSURE: 148 MMHG

## 2023-05-27 DIAGNOSIS — J06.9 ACUTE UPPER RESPIRATORY INFECTION: Primary | ICD-10-CM

## 2023-05-27 PROCEDURE — 99211 OFF/OP EST MAY X REQ PHY/QHP: CPT

## 2023-05-27 RX ORDER — BENZONATATE 100 MG/1
100 CAPSULE ORAL 3 TIMES DAILY PRN
Qty: 30 CAPSULE | Refills: 0 | Status: SHIPPED | OUTPATIENT
Start: 2023-05-27 | End: 2023-06-06

## 2023-05-27 RX ORDER — ALBUTEROL SULFATE 90 UG/1
2 AEROSOL, METERED RESPIRATORY (INHALATION) 4 TIMES DAILY PRN
Qty: 18 G | Refills: 0 | Status: SHIPPED | OUTPATIENT
Start: 2023-05-27

## 2023-05-27 ASSESSMENT — PAIN DESCRIPTION - PAIN TYPE: TYPE: ACUTE PAIN

## 2023-05-27 ASSESSMENT — PAIN DESCRIPTION - LOCATION: LOCATION: GENERALIZED

## 2023-05-27 ASSESSMENT — PAIN - FUNCTIONAL ASSESSMENT: PAIN_FUNCTIONAL_ASSESSMENT: 0-10

## 2023-05-27 ASSESSMENT — PAIN DESCRIPTION - ONSET: ONSET: ON-GOING

## 2023-05-27 ASSESSMENT — PAIN DESCRIPTION - DESCRIPTORS: DESCRIPTORS: ACHING

## 2023-05-27 ASSESSMENT — PAIN DESCRIPTION - FREQUENCY: FREQUENCY: CONTINUOUS

## 2023-05-27 ASSESSMENT — PAIN SCALES - GENERAL: PAINLEVEL_OUTOF10: 5

## 2023-05-27 NOTE — ED PROVIDER NOTES
Department of Emergency Medicine   ED  Provider Note  Admit Date/RoomTime: 5/27/2023  5:11 PM  ED Room: 07/07            Chief Complaint:  Wheezing (Has had cough and wheezing for past 3 days after spraying some bug spray.) and Cough      History of Present Illness:  Source of history provided by:  patient. History/Exam Limitations: none. Dann Degroot is a 60 y.o. old female presenting to the emergency department for cough, congestion, rhinorrhea, which occured 3 day(s) prior to arrival.  Since onset the symptoms have been persistent. Denies chest pain, shortness of breath, difficulty swallowing, difficulty breathing, neck pain, neck stiffness, or rash. Does not  report sick contacts. Does not  report fever, chills and body aches. Patient denies recent travel. Patient denies all other symptoms at this time. Review of Systems:      Pertinent positives and negatives are stated within HPI, all other systems reviewed and are negative. Past Medical History:  has a past medical history of Arthritis, Asthma, COPD (chronic obstructive pulmonary disease) ([facility] [address] Depressed, Diabetes mellitus ([facility] [address] Fibromyalgia, H/O cardiovascular stress test, Hepatitis, HIV (human immunodeficiency virus infection) ([facility] [address] Hyperlipidemia, Hypertension, MS (multiple sclerosis) ([facility] [address] Respiratory disease, Sleep apnea, and Sleep apnea. Past Surgical History:  has a past surgical history that includes sinus surgery; Hysterectomy; Tonsillectomy; Colonoscopy; and Tonsillectomy. Social History:  reports that she has never smoked. She has never used smokeless tobacco. She reports that she does not drink alcohol and does not use drugs. Family History: family history includes Cancer in her sister; High Cholesterol in her father and mother; Hypertension in her mother; Osteoarthritis in her father, mother, and sister.    Allergies: Amoxicillin, Augmentin [amoxicillin-pot clavulanate], Clavulanic acid, Venlafaxine, and

## 2023-06-06 ENCOUNTER — TELEPHONE (OUTPATIENT)
Dept: SLEEP CENTER | Age: 63
End: 2023-06-06

## 2023-10-17 ENCOUNTER — TELEPHONE (OUTPATIENT)
Dept: SLEEP CENTER | Age: 63
End: 2023-10-17

## 2024-05-01 ENCOUNTER — HOSPITAL ENCOUNTER (OUTPATIENT)
Age: 64
Discharge: HOME | End: 2024-05-01
Payer: COMMERCIAL

## 2024-05-01 PROCEDURE — 36415 COLL VENOUS BLD VENIPUNCTURE: CPT
